# Patient Record
Sex: FEMALE | Race: WHITE | NOT HISPANIC OR LATINO | Employment: FULL TIME | URBAN - METROPOLITAN AREA
[De-identification: names, ages, dates, MRNs, and addresses within clinical notes are randomized per-mention and may not be internally consistent; named-entity substitution may affect disease eponyms.]

---

## 2017-01-05 ENCOUNTER — TELEPHONE (OUTPATIENT)
Dept: OBSTETRICS AND GYNECOLOGY | Age: 42
End: 2017-01-05

## 2017-01-05 NOTE — TELEPHONE ENCOUNTER
"----- Message from Chana Matute sent at 1/5/2017  9:31 AM EST -----  This is the original message sent on 12/27/16. Dr PRESLEY abarca, has had ovarian cysts both drained and removed in the past, pt states that she has a \"lg blood vessel\" in her vaginal and it is hurting and bleeding after sex. Pt has seen the NP in the past and would like to see  Link Please call pt at [phone] Pt had to cancel her appt at 1030 today bc shes sick, when can we work her in? pls leave message with date/time at [phone] (husbands phone) or let me know and I will call and leave message  "

## 2017-01-17 ENCOUNTER — OFFICE VISIT (OUTPATIENT)
Dept: OBSTETRICS AND GYNECOLOGY | Age: 42
End: 2017-01-17

## 2017-01-17 VITALS
BODY MASS INDEX: 39.65 KG/M2 | WEIGHT: 238 LBS | SYSTOLIC BLOOD PRESSURE: 128 MMHG | HEIGHT: 65 IN | DIASTOLIC BLOOD PRESSURE: 76 MMHG

## 2017-01-17 DIAGNOSIS — R32 INCONTINENCE IN FEMALE: ICD-10-CM

## 2017-01-17 DIAGNOSIS — Z90.710 HISTORY OF TOTAL VAGINAL HYSTERECTOMY: Primary | ICD-10-CM

## 2017-01-17 DIAGNOSIS — R61 SWEATING PROFUSELY: ICD-10-CM

## 2017-01-17 DIAGNOSIS — N64.4 BREAST PAIN IN FEMALE: ICD-10-CM

## 2017-01-17 DIAGNOSIS — R63.8 INCREASED BMI: ICD-10-CM

## 2017-01-17 DIAGNOSIS — F11.20 NARCOTIC DEPENDENCE (HCC): ICD-10-CM

## 2017-01-17 PROBLEM — G89.4 CHRONIC PAIN DISORDER: Status: ACTIVE | Noted: 2017-01-17

## 2017-01-17 PROBLEM — F32.A DEPRESSION: Status: ACTIVE | Noted: 2017-01-17

## 2017-01-17 PROCEDURE — 99214 OFFICE O/P EST MOD 30 MIN: CPT | Performed by: OBSTETRICS & GYNECOLOGY

## 2017-01-17 RX ORDER — HYDROCODONE BITARTRATE AND ACETAMINOPHEN 10; 325 MG/1; MG/1
1 TABLET ORAL 3 TIMES DAILY
Refills: 0 | COMMUNITY
Start: 2017-01-10

## 2017-01-17 RX ORDER — AZITHROMYCIN 250 MG/1
TABLET, FILM COATED ORAL
Refills: 0 | COMMUNITY
Start: 2017-01-06 | End: 2020-11-16

## 2017-01-17 RX ORDER — BUSPIRONE HYDROCHLORIDE 7.5 MG/1
7.5 TABLET ORAL 2 TIMES DAILY
Refills: 11 | COMMUNITY
Start: 2016-11-25

## 2017-01-17 RX ORDER — CLONAZEPAM 1 MG/1
1 TABLET ORAL 4 TIMES DAILY PRN
Refills: 3 | COMMUNITY
Start: 2016-12-22

## 2017-01-17 RX ORDER — QUETIAPINE FUMARATE 200 MG/1
200 TABLET, FILM COATED ORAL
Refills: 4 | COMMUNITY
Start: 2016-12-20

## 2017-01-17 RX ORDER — BUPROPION HYDROCHLORIDE 200 MG/1
200 TABLET, EXTENDED RELEASE ORAL 2 TIMES DAILY
Refills: 10 | COMMUNITY
Start: 2017-01-05

## 2017-01-17 RX ORDER — CITALOPRAM 40 MG/1
40 TABLET ORAL EVERY MORNING
Refills: 11 | COMMUNITY
Start: 2016-12-20

## 2017-01-17 RX ORDER — ESTRADIOL 0.1 MG/G
0.5 CREAM VAGINAL 2 TIMES WEEKLY
Qty: 15 G | Refills: 12 | Status: SHIPPED | OUTPATIENT
Start: 2017-01-19 | End: 2020-11-16

## 2017-01-17 NOTE — PROGRESS NOTES
Subjective     Chief Complaint   Patient presents with   • Gynecologic Exam     Blood vessel in vaginal area causing pain,increased hot flashes, ?lump in left breast       Ester Kaur is a 41 y.o. female is being seen today for   Chief Complaint   Patient presents with   • Gynecologic Exam     Blood vessel in vaginal area causing pain,increased hot flashes, ?lump in left breast   She is status post total vaginal hysterectomy and had a TVT by Dr. Dupont.  She has multiple medical issues occurring.  She's been having a lot of hot flashes and sweating and dyspareunia    She is also overdue for a mammogram    She is seen her primary care physician for depression, chronic back pain, disc disease, carpal tunnel syndrome, increased body mass index    She also notes that since she's gained a lot of weight over the past several years her incontinence has returned.  She's not sure if she feels anything unusual in her breast but she would like to get a baseline mammogram.    She also complains of occasional dyspareunia.  This may be associated with her sweats and hot flashes      .    History of Present Illness    The following portions of the patient's history were reviewed and updated as appropriate: allergies, current medications, past family history, past medical history, past social history, past surgical history and problem list.    PAST MEDICAL HISTORY  No past medical history on file.  OB History     No data available        Past Surgical History   Procedure Laterality Date   • Hysterectomy       TVT, TVH     No family history on file.  History   Smoking Status   • Former Smoker   Smokeless Tobacco   • Not on file       Current Outpatient Prescriptions:   •  azithromycin (ZITHROMAX) 250 MG tablet, , Disp: , Rfl: 0  •  buPROPion SR (WELLBUTRIN SR) 200 MG 12 hr tablet, TK 1 T PO BID, Disp: , Rfl: 10  •  busPIRone (BUSPAR) 7.5 MG tablet, TK 2 TS PO BID, Disp: , Rfl: 11  •  citalopram (CeleXA) 40 MG tablet, TK 1 T PO D,  Disp: , Rfl: 11  •  clonazePAM (KlonoPIN) 1 MG tablet, TK 1 T PO QID, Disp: , Rfl: 3  •  HYDROcodone-acetaminophen (NORCO)  MG per tablet, TK 1-2 TS PO TID AS NEEDED FOR PAIN, Disp: , Rfl: 0  •  QUEtiapine (SEROquel) 200 MG tablet, TK 1 T PO D, Disp: , Rfl: 4    There is no immunization history on file for this patient.    Review of Systems a complete review of systems is negative except as outlined in history of present illness    Objective   Physical Exam     Alert and oriented, patient is wearing a wrist brace, abdomen is soft nontender no rebound no guarding, external genitalia are normal vagina is clean dry intact.  There are no blood vessel seen there are no masses, no lesions, and she has excellent vaginal cuff support.  Cervix uterus are absent.  Adnexa are nonenlarged and rectal exam is normal      Assessment/Plan   Ester was seen today for gynecologic exam.    Diagnoses and all orders for this visit:    History of total vaginal hysterectomy  -     Follicle Stimulating Hormone  -     Luteinizing Hormone    Sweating profusely  -     Follicle Stimulating Hormone  -     Luteinizing Hormone       if she is menopausal most of her symptoms would respond to some form of estrogen therapy.  He is not menopausal we'll need to further evaluate what is causing her sweating    Increased BMI  -     Follicle Stimulating Hormone  -     Luteinizing Hormone  -     Mammo Screening Bilateral With CAD; Future    Breast pain in female  -     Follicle Stimulating Hormone  -     Luteinizing Hormone     Bilateral mammogram.  Patient will follow-up with PCP  Incontinence in female  -     Follicle Stimulating Hormone  -     Luteinizing Hormone  I have recommended patient try to lose her excessive amount of weight that she has gained.  If she would like to be evaluated by urologist we will do so.  Narcotic dependence  Patient is prescribed hydrocodone and she takes this 6 times a day.  We had a lengthy discussion regarding her  age and poor outcomes with chronic usage of narcotics    Over 35 minutes of time was devoted to this patient encounter

## 2017-01-18 ENCOUNTER — TELEPHONE (OUTPATIENT)
Dept: OBSTETRICS AND GYNECOLOGY | Age: 42
End: 2017-01-18

## 2017-01-18 LAB
FSH SERPL-ACNC: 8.6 MIU/ML
LH SERPL-ACNC: 9.6 MIU/ML

## 2017-01-18 NOTE — TELEPHONE ENCOUNTER
----- Message from Dominik Mccray MD sent at 1/18/2017  8:55 AM EST -----  Please notify patient that she is not menopausal, hormone levels are normal.  There is no gynecological explanation for her symptoms and I recommend following up with her PCP.  In the meantime we are waiting for her mammogram

## 2017-01-18 NOTE — TELEPHONE ENCOUNTER
----- Message from Claribel Sutton sent at 1/18/2017  1:26 PM EST -----  Regarding: UROLOGIST  Contact: [phone]  DR. CANDELARIO GAVE PATIENT THE NAME OF A UROLOGIST, NOW SHE CAN'T REMEMBER WHO IT WAS.  DO YOU KNOW, OR CAN YOU FIND OUT THE NAME? THANKS

## 2017-01-18 NOTE — PROGRESS NOTES
Please notify patient that she is not menopausal, hormone levels are normal.  There is no gynecological explanation for her symptoms and I recommend following up with her PCP.  In the meantime we are waiting for her mammogram

## 2017-01-27 ENCOUNTER — TELEPHONE (OUTPATIENT)
Dept: OBSTETRICS AND GYNECOLOGY | Age: 42
End: 2017-01-27

## 2017-01-27 DIAGNOSIS — N63.0 BREAST LUMP IN FEMALE: Primary | ICD-10-CM

## 2017-02-21 ENCOUNTER — APPOINTMENT (OUTPATIENT)
Dept: MAMMOGRAPHY | Facility: HOSPITAL | Age: 42
End: 2017-02-21
Attending: OBSTETRICS & GYNECOLOGY

## 2017-02-21 ENCOUNTER — TELEPHONE (OUTPATIENT)
Dept: OBSTETRICS AND GYNECOLOGY | Age: 42
End: 2017-02-21

## 2017-02-21 DIAGNOSIS — N63.0 BREAST MASS: Primary | ICD-10-CM

## 2017-02-21 NOTE — TELEPHONE ENCOUNTER
Pt has MG order in for Monday @ [facility], was under the impression she would have US order for a lump in her R armpit as well. Can we put this in?    Pt # [phone]

## 2017-03-03 ENCOUNTER — HOSPITAL ENCOUNTER (OUTPATIENT)
Dept: ULTRASOUND IMAGING | Facility: HOSPITAL | Age: 42
Discharge: HOME | End: 2017-03-03
Attending: OBSTETRICS & GYNECOLOGY

## 2017-03-03 ENCOUNTER — HOSPITAL ENCOUNTER (OUTPATIENT)
Dept: MAMMOGRAPHY | Facility: HOSPITAL | Age: 42
Discharge: HOME | End: 2017-03-03
Attending: OBSTETRICS & GYNECOLOGY | Admitting: OBSTETRICS & GYNECOLOGY

## 2017-03-03 DIAGNOSIS — N63.0 BREAST MASS: ICD-10-CM

## 2017-03-03 DIAGNOSIS — N63.0 BREAST LUMP IN FEMALE: ICD-10-CM

## 2017-03-03 PROCEDURE — 76642 ULTRASOUND BREAST LIMITED: CPT

## 2017-03-03 PROCEDURE — G0204 DX MAMMO INCL CAD BI: HCPCS

## 2020-08-07 ENCOUNTER — LAB REQUISITION (OUTPATIENT)
Dept: LAB | Facility: HOSPITAL | Age: 45
End: 2020-08-07

## 2020-08-07 DIAGNOSIS — D17.30 BENIGN LIPOMATOUS NEOPLASM OF SKIN AND SUBCUTANEOUS TISSUE OF UNSPECIFIED SITES: ICD-10-CM

## 2020-08-07 DIAGNOSIS — M72.2 PLANTAR FASCIAL FIBROMATOSIS: ICD-10-CM

## 2020-08-07 PROCEDURE — 88305 TISSUE EXAM BY PATHOLOGIST: CPT | Performed by: PODIATRIST

## 2020-08-10 LAB
LAB AP CASE REPORT: NORMAL
PATH REPORT.FINAL DX SPEC: NORMAL
PATH REPORT.GROSS SPEC: NORMAL

## 2020-11-16 ENCOUNTER — PREP FOR SURGERY (OUTPATIENT)
Dept: OTHER | Facility: HOSPITAL | Age: 45
End: 2020-11-16

## 2020-11-16 ENCOUNTER — OFFICE VISIT (OUTPATIENT)
Dept: OBSTETRICS AND GYNECOLOGY | Age: 45
End: 2020-11-16

## 2020-11-16 VITALS
BODY MASS INDEX: 41.15 KG/M2 | WEIGHT: 247 LBS | HEIGHT: 65 IN | SYSTOLIC BLOOD PRESSURE: 134 MMHG | DIASTOLIC BLOOD PRESSURE: 76 MMHG

## 2020-11-16 DIAGNOSIS — N90.7 VULVAR CYST: ICD-10-CM

## 2020-11-16 DIAGNOSIS — N90.89 PERINEAL CYST IN FEMALE: Primary | ICD-10-CM

## 2020-11-16 DIAGNOSIS — F34.1 DYSTHYMIA: ICD-10-CM

## 2020-11-16 DIAGNOSIS — Z01.419 ENCOUNTER FOR GYNECOLOGICAL EXAMINATION: Primary | ICD-10-CM

## 2020-11-16 PROCEDURE — 99386 PREV VISIT NEW AGE 40-64: CPT | Performed by: OBSTETRICS & GYNECOLOGY

## 2020-11-16 PROCEDURE — 99213 OFFICE O/P EST LOW 20 MIN: CPT | Performed by: OBSTETRICS & GYNECOLOGY

## 2020-11-16 RX ORDER — GABAPENTIN 300 MG/1
300 CAPSULE ORAL 3 TIMES DAILY
COMMUNITY
Start: 2020-09-17

## 2020-11-16 RX ORDER — SODIUM CHLORIDE 0.9 % (FLUSH) 0.9 %
3 SYRINGE (ML) INJECTION EVERY 12 HOURS SCHEDULED
Status: CANCELLED | OUTPATIENT
Start: 2021-03-12

## 2020-11-16 RX ORDER — SODIUM CHLORIDE 0.9 % (FLUSH) 0.9 %
10 SYRINGE (ML) INJECTION AS NEEDED
Status: CANCELLED | OUTPATIENT
Start: 2021-03-12

## 2020-11-16 NOTE — PROGRESS NOTES
"Subjective     Chief Complaint   Patient presents with   • Gynecologic Exam     New gyn: re-estab.care,last seen 2017,last pap and mammo 2017,Needs order for mammo         History of Present Illness    Ester Kaur is a very pleasant 45 y.o. female who presents for annual exam., Mammo Exam overdue, Exercise walks 5 times a week  Patient is essentially a new patient because it is been over 3 years since she was seen  She is in for an annual examination but she also has some problems she wanted to discuss.  She is overdue for her mammogram.  She is getting her wellness labs with her PCP  She is still working, her children are doing well.    The problem she wanted discussed was an area of enlargement on her perineal area on the left vulvar region.  This is a recurrent problem.  She does not have any fever or chills no changes in urination or bowel movements.      Obstetric History:  OB History        3    Para   3    Term   3            AB        Living           SAB        TAB        Ectopic        Molar        Multiple        Live Births                   Menstrual History:     No LMP recorded. Patient has had a hysterectomy.       Sexual History:       No past medical history on file.   Past Surgical History:   Procedure Laterality Date   • HYSTERECTOMY      TVT, TVH       SOCIAL Hx:      The following portions of the patient's history were reviewed and updated as appropriate: allergies, current medications, past family history, past medical history, past social history, past surgical history and problem list.    Review of Systems        Except as outlined in history of physical illness, patient denies any changes in her GYN, , GI systems. All other systems reviewed are negative         Objective   Physical Exam    /76   Ht 165.1 cm (65\")   Wt 112 kg (247 lb)   Breastfeeding No   BMI 41.10 kg/m²     General: Patient is alert and oriented and appears overall healthy  Neck: Is supple " without thyromegaly, no carotid bruits and no lymphadenopathy  Lungs: Clear bilaterally, no wheezing, rhonchi, or rales.  Respiratory rate is normal  Breast: Symmetrical, no lymphadenopathy, no masses, no erythema.  Heart: Regular rate and rhythm are appreciated, no murmurs or rubs are heard  Abdomen: Is soft, without organomegaly, bowel sounds are positive, there is no                                rebound or guarding and palpation does not produce any discomfort  Back: Nontender without CVA tenderness  Pelvic: External genitalia appear normal and consistent with mature female.  BUS, there is a 3-1/2 cm vulvar cyst.  It is mobile there is no evidence of infection.  It appears fluid-filled.                            Vagina is clean dry without discharge and appears adequately estrogenized, no               lesions or masses are present                         Adnexa are non-enlarged, non tender               Rectal exam reveals adequate sphincter tone and no masses or lesions are                     appreciated on digital rectal examination.    Annual Well Woman Exam     Patient Active Problem List   Diagnosis   • History of total vaginal hysterectomy   • Narcotic dependence (CMS/HCC)   • Chronic pain disorder   • Depression               Assessment/Plan   Diagnoses and all orders for this visit:    1. Encounter for gynecological examination (Primary)  -     IGP, Apt HPV,rfx 16 / 18,45  -     Mammo Screening Digital Tomosynthesis Bilateral With CAD; Future    2. Vulvar cyst  .  As outlined above this is a problem in addition to her wellness visit concerns.  This is a recurrent cyst, we are going to schedule an excision.  Risk benefits potential complications reviewed.  Pertinent history outlined above  3. Dysthymia        Discussed today's findings and concerns with patient.  Continue to recommend regular exercise including cardiovascular and resistance training as well as  breast self-exam. Wellness lab,  mammography, & pap smear, in accordance with age guidelines.    I have encouraged her to call for today's test results if she has not received them within 10 days.  Patient is advised to call with any change in her condition or with any other questions, otherwise return  for annual examination.

## 2020-11-18 LAB
CYTOLOGIST CVX/VAG CYTO: NORMAL
CYTOLOGY CVX/VAG DOC CYTO: NORMAL
CYTOLOGY CVX/VAG DOC THIN PREP: NORMAL
DX ICD CODE: NORMAL
HIV 1 & 2 AB SER-IMP: NORMAL
HPV I/H RISK 4 DNA CVX QL PROBE+SIG AMP: NEGATIVE
OTHER STN SPEC: NORMAL
STAT OF ADQ CVX/VAG CYTO-IMP: NORMAL

## 2020-12-15 PROBLEM — N90.89 PERINEAL CYST IN FEMALE: Status: ACTIVE | Noted: 2020-12-15

## 2021-02-27 ENCOUNTER — APPOINTMENT (OUTPATIENT)
Dept: MAMMOGRAPHY | Facility: HOSPITAL | Age: 46
End: 2021-02-27

## 2021-03-10 ENCOUNTER — APPOINTMENT (OUTPATIENT)
Dept: PREADMISSION TESTING | Facility: HOSPITAL | Age: 46
End: 2021-03-10

## 2021-03-10 VITALS
DIASTOLIC BLOOD PRESSURE: 82 MMHG | TEMPERATURE: 97.5 F | SYSTOLIC BLOOD PRESSURE: 146 MMHG | WEIGHT: 244.9 LBS | HEART RATE: 80 BPM | RESPIRATION RATE: 16 BRPM | OXYGEN SATURATION: 97 % | HEIGHT: 65 IN | BODY MASS INDEX: 40.8 KG/M2

## 2021-03-10 LAB
ANION GAP SERPL CALCULATED.3IONS-SCNC: 8.9 MMOL/L (ref 5–15)
BUN SERPL-MCNC: 13 MG/DL (ref 6–20)
BUN/CREAT SERPL: 13.3 (ref 7–25)
CALCIUM SPEC-SCNC: 8.9 MG/DL (ref 8.6–10.5)
CHLORIDE SERPL-SCNC: 102 MMOL/L (ref 98–107)
CO2 SERPL-SCNC: 28.1 MMOL/L (ref 22–29)
CREAT SERPL-MCNC: 0.98 MG/DL (ref 0.57–1)
DEPRECATED RDW RBC AUTO: 45.8 FL (ref 37–54)
ERYTHROCYTE [DISTWIDTH] IN BLOOD BY AUTOMATED COUNT: 12.9 % (ref 12.3–15.4)
GFR SERPL CREATININE-BSD FRML MDRD: 62 ML/MIN/1.73
GLUCOSE SERPL-MCNC: 80 MG/DL (ref 65–99)
HCT VFR BLD AUTO: 35.7 % (ref 34–46.6)
HGB BLD-MCNC: 12 G/DL (ref 12–15.9)
MCH RBC QN AUTO: 32.4 PG (ref 26.6–33)
MCHC RBC AUTO-ENTMCNC: 33.6 G/DL (ref 31.5–35.7)
MCV RBC AUTO: 96.5 FL (ref 79–97)
PLATELET # BLD AUTO: 223 10*3/MM3 (ref 140–450)
PMV BLD AUTO: 10.2 FL (ref 6–12)
POTASSIUM SERPL-SCNC: 3.9 MMOL/L (ref 3.5–5.2)
RBC # BLD AUTO: 3.7 10*6/MM3 (ref 3.77–5.28)
SODIUM SERPL-SCNC: 139 MMOL/L (ref 136–145)
WBC # BLD AUTO: 5.66 10*3/MM3 (ref 3.4–10.8)

## 2021-03-10 PROCEDURE — U0004 COV-19 TEST NON-CDC HGH THRU: HCPCS

## 2021-03-10 PROCEDURE — 80048 BASIC METABOLIC PNL TOTAL CA: CPT

## 2021-03-10 PROCEDURE — C9803 HOPD COVID-19 SPEC COLLECT: HCPCS

## 2021-03-10 PROCEDURE — 85027 COMPLETE CBC AUTOMATED: CPT

## 2021-03-10 PROCEDURE — 36415 COLL VENOUS BLD VENIPUNCTURE: CPT

## 2021-03-10 NOTE — DISCHARGE INSTRUCTIONS
Take the following medications the morning of surgery:  BUPROPION, CITALOPRAM, BUSPIRONE, GABAPENTIN, CLONAZEPAM, HYDROCODONE IF NEEDED    ARRIVE 10:00 AM  3/12      If you are on prescription narcotic pain medication to control your pain you may also take that medication the morning of surgery.    General Instructions:  • Do not eat solid food after midnight the night before surgery.  • You may drink clear liquids day of surgery but must stop at least one hour before your hospital arrival time.  • It is beneficial for you to have a clear drink that contains carbohydrates the day of surgery.  We suggest a 12 to 20 ounce bottle of Gatorade or Powerade for non-diabetic patients or a 12 to 20 ounce bottle of G2 or Powerade Zero for diabetic patients. (Pediatric patients, are not advised to drink a 12 to 20 ounce carbohydrate drink)    Clear liquids are liquids you can see through.  Nothing red in color.     Plain water                               Sports drinks  Sodas                                   Gelatin (Jell-O)  Fruit juices without pulp such as white grape juice and apple juice  Popsicles that contain no fruit or yogurt  Tea or coffee (no cream or milk added)  Gatorade / Powerade  G2 / Powerade Zero    • Infants may have breast milk up to four hours before surgery.  • Infants drinking formula may drink formula up to six hours before surgery.   • Patients who avoid smoking, chewing tobacco and alcohol for 4 weeks prior to surgery have a reduced risk of post-operative complications.  Quit smoking as many days before surgery as you can.  • Do not smoke, use chewing tobacco or drink alcohol the day of surgery.   • If applicable bring your C-PAP/ BI-PAP machine.  • Bring any papers given to you in the doctor’s office.  • Wear clean comfortable clothes.  • Do not wear contact lenses, false eyelashes or make-up.  Bring a case for your glasses.   • Bring crutches or walker if applicable.  • Remove all piercings.   Leave jewelry and any other valuables at home.  • Hair extensions with metal clips must be removed prior to surgery.  • The Pre-Admission Testing nurse will instruct you to bring medications if unable to obtain an accurate list in Pre-Admission Testing.        If you were given a blood bank ID arm band remember to bring it with you the day of surgery.    Preventing a Surgical Site Infection:  • For 2 to 3 days before surgery, avoid shaving with a razor because the razor can irritate skin and make it easier to develop an infection.    • Any areas of open skin can increase the risk of a post-operative wound infection by allowing bacteria to enter and travel throughout the body.  Notify your surgeon if you have any skin wounds / rashes even if it is not near the expected surgical site.  The area will need assessed to determine if surgery should be delayed until it is healed.  • The night prior to surgery shower using a fresh bar of anti-bacterial soap (such as Dial) and clean washcloth.  Sleep in a clean bed with clean clothing.  Do not allow pets to sleep with you.  • Shower on the morning of surgery using a fresh bar of anti-bacterial soap (such as Dial) and clean washcloth.  Dry with a clean towel and dress in clean clothing.  • Ask your surgeon if you will be receiving antibiotics prior to surgery.  • Make sure you, your family, and all healthcare providers clean their hands with soap and water or an alcohol based hand  before caring for you or your wound.    Day of surgery:  Your arrival time is approximately two hours before your scheduled surgery time.  Upon arrival, a Pre-op nurse and Anesthesiologist will review your health history, obtain vital signs, and answer questions you may have.  The only belongings needed at this time will be a list of your home medications and if applicable your C-PAP/BI-PAP machine.  A Pre-op nurse will start an IV and you may receive medication in preparation for surgery,  including something to help you relax.     Please be aware that surgery does come with discomfort.  We want to make every effort to control your discomfort so please discuss any uncontrolled symptoms with your nurse.   Your doctor will most likely have prescribed pain medications.      If you are going home after surgery you will receive individualized written care instructions before being discharged.  A responsible adult must drive you to and from the hospital on the day of your surgery and stay with you for 24 hours.  Discharge prescriptions can be filled by the hospital pharmacy during regular pharmacy hours.  If you are having surgery late in the day/evening your prescription may be e-prescribed to your pharmacy.  Please verify your pharmacy hours or chose a 24 hour pharmacy to avoid not having access to your prescription because your pharmacy has closed for the day.    If you are staying overnight following surgery, you will be transported to your hospital room following the recovery period.  [facility] has all private rooms.    If you have any questions please call Pre-Admission Testing at [phone].  Deductibles and co-payments are collected on the day of service. Please be prepared to pay the required co-pay, deductible or deposit on the day of service as defined by your plan.    Patient Education for Self-Quarantine Process    Following your COVID testing, we strongly recommend that you do not leave your home after you have been tested for COVID except to get medical care. This includes not going to work, school or to public areas.  If this is not possible for you to do please limit your activities to only required outings.  Be sure to wear a mask when you are with other people, practice social distancing and wash your hands frequently.      The following items provide additional details to keep you safe.  • Wash your hands with soap and water frequently for at least 20 seconds.    • Avoid touching your eyes, nose and mouth with unwashed hands.  • Do not share anything - utensils, towels, food from the same bowl.   • Have your own utensils, drinking glass, dishes, towels and bedding.   • Do not have visitors.   • Do use FaceTime to stay in touch with family and friends.  • You should stay in a specific room away from others if possible.   • Stay at least 6 feet away from others in the home if you cannot have a dedicated room to yourself.   • Do not snuggle with your pet. While the CDC says there is no evidence that pets can spread COVID-19 or be infected from humans, it is probably best to avoid “petting, snuggling, being kissed or licked and sharing food (during self-quarantine)”, according to the CDC.   • Sanitize household surfaces daily. Include all high touch areas (door handles, light switches, phones, countertops, etc.)  • Do not share a bathroom with others, if possible.   • Wear a mask around others in your home if you are unable to stay in a separate room or 6 feet apart. If  you are unable to wear a mask, have your family member wear a mask if they must be within 6 feet of you.   Call your surgeon immediately if you experience any of the following symptoms:  • Sore Throat  • Shortness of Breath or difficulty breathing  • Cough  • Chills  • Body soreness or muscle pain  • Headache  • Fever  • New loss of taste or smell  • Do not arrive for your surgery ill.  Your procedure will need to be rescheduled to another time.  You will need to call your physician before the day of surgery to avoid any unnecessary exposure to hospital staff as well as other patients.    CHLORHEXIDINE CLOTH INSTRUCTIONS  The morning of surgery follow these instructions using the Chlorhexidine cloths you've been given.  These steps reduce bacteria on the body.  Do not use the cloths near your eyes, ears mouth, genitalia or on open wounds.  Throw the cloths away after use but do not try to flush them down a  toilet.      • Open and remove one cloth at a time from the package.    • Leave the cloth unfolded and begin the bathing.  • Massage the skin with the cloths using gentle pressure to remove bacteria.  Do not scrub harshly.   • Follow the steps below with one 2% CHG cloth per area (6 total cloths).  • One cloth for neck, shoulders and chest.  • One cloth for both arms, hands, fingers and underarms (do underarms last).  • One cloth for the abdomen followed by groin.  • One cloth for right leg and foot including between the toes.  • One cloth for left leg and foot including between the toes.  • The last cloth is to be used for the back of the neck, back and buttocks.    Allow the CHG to air dry 3 minutes on the skin which will give it time to work and decrease the chance of irritation.  The skin may feel sticky until it is dry.  Do not rinse with water or any other liquid or you will lose the beneficial effects of the CHG.  If mild skin irritation occurs, do rinse the skin to remove the CHG.  Report this to the nurse at time of admission.  Do not apply lotions, creams, ointments, deodorants or perfumes after using the clothes. Dress in clean clothes before coming to the hospital.

## 2021-03-11 LAB — SARS-COV-2 RNA RESP QL NAA+PROBE: NOT DETECTED

## 2021-03-12 ENCOUNTER — HOSPITAL ENCOUNTER (OUTPATIENT)
Facility: HOSPITAL | Age: 46
Setting detail: HOSPITAL OUTPATIENT SURGERY
Discharge: HOME | End: 2021-03-12
Attending: OBSTETRICS & GYNECOLOGY | Admitting: OBSTETRICS & GYNECOLOGY

## 2021-03-12 ENCOUNTER — ANESTHESIA EVENT (OUTPATIENT)
Dept: PERIOP | Facility: HOSPITAL | Age: 46
End: 2021-03-12

## 2021-03-12 ENCOUNTER — ANESTHESIA (OUTPATIENT)
Dept: PERIOP | Facility: HOSPITAL | Age: 46
End: 2021-03-12

## 2021-03-12 VITALS
RESPIRATION RATE: 16 BRPM | DIASTOLIC BLOOD PRESSURE: 76 MMHG | WEIGHT: 241.62 LBS | HEIGHT: 65 IN | TEMPERATURE: 98.2 F | HEART RATE: 75 BPM | SYSTOLIC BLOOD PRESSURE: 137 MMHG | BODY MASS INDEX: 40.26 KG/M2 | OXYGEN SATURATION: 99 %

## 2021-03-12 DIAGNOSIS — N90.89 PERINEAL CYST IN FEMALE: ICD-10-CM

## 2021-03-12 PROCEDURE — 25010000002 ONDANSETRON PER 1 MG: Performed by: NURSE ANESTHETIST, CERTIFIED REGISTERED

## 2021-03-12 PROCEDURE — 25010000002 FENTANYL CITRATE (PF) 100 MCG/2ML SOLUTION: Performed by: NURSE ANESTHETIST, CERTIFIED REGISTERED

## 2021-03-12 PROCEDURE — 88304 TISSUE EXAM BY PATHOLOGIST: CPT | Performed by: OBSTETRICS & GYNECOLOGY

## 2021-03-12 PROCEDURE — 25010000002 KETOROLAC TROMETHAMINE PER 15 MG: Performed by: NURSE ANESTHETIST, CERTIFIED REGISTERED

## 2021-03-12 PROCEDURE — 25010000002 HYDROMORPHONE PER 4 MG: Performed by: NURSE ANESTHETIST, CERTIFIED REGISTERED

## 2021-03-12 PROCEDURE — S0260 H&P FOR SURGERY: HCPCS | Performed by: OBSTETRICS & GYNECOLOGY

## 2021-03-12 PROCEDURE — 25010000003 CEFAZOLIN IN DEXTROSE 2-4 GM/100ML-% SOLUTION: Performed by: NURSE ANESTHETIST, CERTIFIED REGISTERED

## 2021-03-12 PROCEDURE — 11426 EXC H-F-NK-SP B9+MARG >4 CM: CPT | Performed by: OBSTETRICS & GYNECOLOGY

## 2021-03-12 PROCEDURE — 25010000002 MIDAZOLAM PER 1 MG: Performed by: ANESTHESIOLOGY

## 2021-03-12 PROCEDURE — 25010000002 DEXAMETHASONE PER 1 MG: Performed by: NURSE ANESTHETIST, CERTIFIED REGISTERED

## 2021-03-12 PROCEDURE — 25010000002 PROPOFOL 10 MG/ML EMULSION: Performed by: NURSE ANESTHETIST, CERTIFIED REGISTERED

## 2021-03-12 RX ORDER — NALOXONE HCL 0.4 MG/ML
0.2 VIAL (ML) INJECTION AS NEEDED
Status: DISCONTINUED | OUTPATIENT
Start: 2021-03-12 | End: 2021-03-12 | Stop reason: HOSPADM

## 2021-03-12 RX ORDER — SODIUM CHLORIDE 0.9 % (FLUSH) 0.9 %
3 SYRINGE (ML) INJECTION EVERY 12 HOURS SCHEDULED
Status: DISCONTINUED | OUTPATIENT
Start: 2021-03-12 | End: 2021-03-12 | Stop reason: HOSPADM

## 2021-03-12 RX ORDER — FLUMAZENIL 0.1 MG/ML
0.2 INJECTION INTRAVENOUS AS NEEDED
Status: DISCONTINUED | OUTPATIENT
Start: 2021-03-12 | End: 2021-03-12 | Stop reason: HOSPADM

## 2021-03-12 RX ORDER — FENTANYL CITRATE 50 UG/ML
INJECTION, SOLUTION INTRAMUSCULAR; INTRAVENOUS AS NEEDED
Status: DISCONTINUED | OUTPATIENT
Start: 2021-03-12 | End: 2021-03-12 | Stop reason: SURG

## 2021-03-12 RX ORDER — OXYCODONE AND ACETAMINOPHEN 7.5; 325 MG/1; MG/1
1 TABLET ORAL ONCE AS NEEDED
Status: COMPLETED | OUTPATIENT
Start: 2021-03-12 | End: 2021-03-12

## 2021-03-12 RX ORDER — ONDANSETRON 2 MG/ML
4 INJECTION INTRAMUSCULAR; INTRAVENOUS ONCE AS NEEDED
Status: DISCONTINUED | OUTPATIENT
Start: 2021-03-12 | End: 2021-03-12 | Stop reason: HOSPADM

## 2021-03-12 RX ORDER — GLYCOPYRROLATE 0.2 MG/ML
INJECTION INTRAMUSCULAR; INTRAVENOUS AS NEEDED
Status: DISCONTINUED | OUTPATIENT
Start: 2021-03-12 | End: 2021-03-12 | Stop reason: SURG

## 2021-03-12 RX ORDER — PROMETHAZINE HYDROCHLORIDE 25 MG/1
25 SUPPOSITORY RECTAL ONCE AS NEEDED
Status: DISCONTINUED | OUTPATIENT
Start: 2021-03-12 | End: 2021-03-12 | Stop reason: HOSPADM

## 2021-03-12 RX ORDER — SODIUM CHLORIDE 0.9 % (FLUSH) 0.9 %
10 SYRINGE (ML) INJECTION AS NEEDED
Status: DISCONTINUED | OUTPATIENT
Start: 2021-03-12 | End: 2021-03-12 | Stop reason: HOSPADM

## 2021-03-12 RX ORDER — FENTANYL CITRATE 50 UG/ML
50 INJECTION, SOLUTION INTRAMUSCULAR; INTRAVENOUS
Status: DISCONTINUED | OUTPATIENT
Start: 2021-03-12 | End: 2021-03-12 | Stop reason: HOSPADM

## 2021-03-12 RX ORDER — DEXAMETHASONE SODIUM PHOSPHATE 10 MG/ML
INJECTION INTRAMUSCULAR; INTRAVENOUS AS NEEDED
Status: DISCONTINUED | OUTPATIENT
Start: 2021-03-12 | End: 2021-03-12 | Stop reason: SURG

## 2021-03-12 RX ORDER — SODIUM CHLORIDE 0.9 % (FLUSH) 0.9 %
3-10 SYRINGE (ML) INJECTION AS NEEDED
Status: DISCONTINUED | OUTPATIENT
Start: 2021-03-12 | End: 2021-03-12 | Stop reason: HOSPADM

## 2021-03-12 RX ORDER — ACETAMINOPHEN 500 MG
1000 TABLET ORAL ONCE
Status: COMPLETED | OUTPATIENT
Start: 2021-03-12 | End: 2021-03-12

## 2021-03-12 RX ORDER — KETOROLAC TROMETHAMINE 30 MG/ML
INJECTION, SOLUTION INTRAMUSCULAR; INTRAVENOUS AS NEEDED
Status: DISCONTINUED | OUTPATIENT
Start: 2021-03-12 | End: 2021-03-12 | Stop reason: SURG

## 2021-03-12 RX ORDER — ONDANSETRON 2 MG/ML
INJECTION INTRAMUSCULAR; INTRAVENOUS AS NEEDED
Status: DISCONTINUED | OUTPATIENT
Start: 2021-03-12 | End: 2021-03-12 | Stop reason: SURG

## 2021-03-12 RX ORDER — HYDROMORPHONE HYDROCHLORIDE 1 MG/ML
0.25 INJECTION, SOLUTION INTRAMUSCULAR; INTRAVENOUS; SUBCUTANEOUS
Status: DISCONTINUED | OUTPATIENT
Start: 2021-03-12 | End: 2021-03-12 | Stop reason: HOSPADM

## 2021-03-12 RX ORDER — DIPHENHYDRAMINE HYDROCHLORIDE 50 MG/ML
12.5 INJECTION INTRAMUSCULAR; INTRAVENOUS
Status: DISCONTINUED | OUTPATIENT
Start: 2021-03-12 | End: 2021-03-12 | Stop reason: HOSPADM

## 2021-03-12 RX ORDER — MIDAZOLAM HYDROCHLORIDE 1 MG/ML
2 INJECTION INTRAMUSCULAR; INTRAVENOUS
Status: DISCONTINUED | OUTPATIENT
Start: 2021-03-12 | End: 2021-03-12 | Stop reason: HOSPADM

## 2021-03-12 RX ORDER — LIDOCAINE HYDROCHLORIDE 20 MG/ML
INJECTION, SOLUTION INFILTRATION; PERINEURAL AS NEEDED
Status: DISCONTINUED | OUTPATIENT
Start: 2021-03-12 | End: 2021-03-12 | Stop reason: SURG

## 2021-03-12 RX ORDER — DIPHENHYDRAMINE HCL 25 MG
25 CAPSULE ORAL
Status: DISCONTINUED | OUTPATIENT
Start: 2021-03-12 | End: 2021-03-12 | Stop reason: HOSPADM

## 2021-03-12 RX ORDER — LIDOCAINE HYDROCHLORIDE 10 MG/ML
0.5 INJECTION, SOLUTION EPIDURAL; INFILTRATION; INTRACAUDAL; PERINEURAL ONCE AS NEEDED
Status: DISCONTINUED | OUTPATIENT
Start: 2021-03-12 | End: 2021-03-12 | Stop reason: HOSPADM

## 2021-03-12 RX ORDER — PROPOFOL 10 MG/ML
VIAL (ML) INTRAVENOUS AS NEEDED
Status: DISCONTINUED | OUTPATIENT
Start: 2021-03-12 | End: 2021-03-12 | Stop reason: SURG

## 2021-03-12 RX ORDER — MIDAZOLAM HYDROCHLORIDE 1 MG/ML
1 INJECTION INTRAMUSCULAR; INTRAVENOUS
Status: DISCONTINUED | OUTPATIENT
Start: 2021-03-12 | End: 2021-03-12 | Stop reason: HOSPADM

## 2021-03-12 RX ORDER — PROMETHAZINE HYDROCHLORIDE 25 MG/1
25 TABLET ORAL ONCE AS NEEDED
Status: DISCONTINUED | OUTPATIENT
Start: 2021-03-12 | End: 2021-03-12 | Stop reason: HOSPADM

## 2021-03-12 RX ORDER — BUPIVACAINE HYDROCHLORIDE 2.5 MG/ML
INJECTION, SOLUTION EPIDURAL; INFILTRATION; INTRACAUDAL AS NEEDED
Status: DISCONTINUED | OUTPATIENT
Start: 2021-03-12 | End: 2021-03-12 | Stop reason: HOSPADM

## 2021-03-12 RX ORDER — FAMOTIDINE 10 MG/ML
20 INJECTION, SOLUTION INTRAVENOUS ONCE
Status: COMPLETED | OUTPATIENT
Start: 2021-03-12 | End: 2021-03-12

## 2021-03-12 RX ORDER — HYDROCODONE BITARTRATE AND ACETAMINOPHEN 7.5; 325 MG/1; MG/1
1 TABLET ORAL ONCE AS NEEDED
Status: DISCONTINUED | OUTPATIENT
Start: 2021-03-12 | End: 2021-03-12 | Stop reason: HOSPADM

## 2021-03-12 RX ORDER — LABETALOL HYDROCHLORIDE 5 MG/ML
5 INJECTION, SOLUTION INTRAVENOUS
Status: DISCONTINUED | OUTPATIENT
Start: 2021-03-12 | End: 2021-03-12 | Stop reason: HOSPADM

## 2021-03-12 RX ORDER — CEFAZOLIN SODIUM 2 G/100ML
INJECTION, SOLUTION INTRAVENOUS AS NEEDED
Status: DISCONTINUED | OUTPATIENT
Start: 2021-03-12 | End: 2021-03-12 | Stop reason: SURG

## 2021-03-12 RX ORDER — ALBUTEROL SULFATE 2.5 MG/3ML
2.5 SOLUTION RESPIRATORY (INHALATION) ONCE AS NEEDED
Status: DISCONTINUED | OUTPATIENT
Start: 2021-03-12 | End: 2021-03-12 | Stop reason: HOSPADM

## 2021-03-12 RX ORDER — SODIUM CHLORIDE, SODIUM LACTATE, POTASSIUM CHLORIDE, CALCIUM CHLORIDE 600; 310; 30; 20 MG/100ML; MG/100ML; MG/100ML; MG/100ML
9 INJECTION, SOLUTION INTRAVENOUS CONTINUOUS
Status: DISCONTINUED | OUTPATIENT
Start: 2021-03-12 | End: 2021-03-12 | Stop reason: HOSPADM

## 2021-03-12 RX ORDER — MAGNESIUM HYDROXIDE 1200 MG/15ML
LIQUID ORAL AS NEEDED
Status: DISCONTINUED | OUTPATIENT
Start: 2021-03-12 | End: 2021-03-12 | Stop reason: HOSPADM

## 2021-03-12 RX ORDER — EPHEDRINE SULFATE 50 MG/ML
5 INJECTION, SOLUTION INTRAVENOUS ONCE AS NEEDED
Status: DISCONTINUED | OUTPATIENT
Start: 2021-03-12 | End: 2021-03-12 | Stop reason: HOSPADM

## 2021-03-12 RX ADMIN — KETOROLAC TROMETHAMINE 30 MG: 30 INJECTION, SOLUTION INTRAMUSCULAR at 12:21

## 2021-03-12 RX ADMIN — SODIUM CHLORIDE, POTASSIUM CHLORIDE, SODIUM LACTATE AND CALCIUM CHLORIDE 9 ML/HR: 600; 310; 30; 20 INJECTION, SOLUTION INTRAVENOUS at 11:05

## 2021-03-12 RX ADMIN — CEFAZOLIN SODIUM 3 G: 2 INJECTION, SOLUTION INTRAVENOUS at 12:18

## 2021-03-12 RX ADMIN — LIDOCAINE HYDROCHLORIDE 80 MG: 20 INJECTION, SOLUTION INFILTRATION; PERINEURAL at 12:06

## 2021-03-12 RX ADMIN — HYDROMORPHONE HYDROCHLORIDE 0.25 MG: 1 INJECTION, SOLUTION INTRAMUSCULAR; INTRAVENOUS; SUBCUTANEOUS at 13:10

## 2021-03-12 RX ADMIN — MIDAZOLAM 1 MG: 1 INJECTION INTRAMUSCULAR; INTRAVENOUS at 11:06

## 2021-03-12 RX ADMIN — HYDROMORPHONE HYDROCHLORIDE 0.25 MG: 1 INJECTION, SOLUTION INTRAMUSCULAR; INTRAVENOUS; SUBCUTANEOUS at 13:23

## 2021-03-12 RX ADMIN — FENTANYL CITRATE 50 MCG: 50 INJECTION, SOLUTION INTRAMUSCULAR; INTRAVENOUS at 13:00

## 2021-03-12 RX ADMIN — FAMOTIDINE 20 MG: 10 INJECTION INTRAVENOUS at 11:05

## 2021-03-12 RX ADMIN — FENTANYL CITRATE 50 MCG: 50 INJECTION INTRAMUSCULAR; INTRAVENOUS at 12:21

## 2021-03-12 RX ADMIN — FENTANYL CITRATE 50 MCG: 50 INJECTION, SOLUTION INTRAMUSCULAR; INTRAVENOUS at 13:07

## 2021-03-12 RX ADMIN — GLYCOPYRROLATE 0.2 MG: 0.2 INJECTION INTRAMUSCULAR; INTRAVENOUS at 12:04

## 2021-03-12 RX ADMIN — ACETAMINOPHEN 1000 MG: 500 TABLET, FILM COATED ORAL at 11:05

## 2021-03-12 RX ADMIN — HYDROMORPHONE HYDROCHLORIDE 0.25 MG: 1 INJECTION, SOLUTION INTRAMUSCULAR; INTRAVENOUS; SUBCUTANEOUS at 13:17

## 2021-03-12 RX ADMIN — PROPOFOL 180 MG: 10 INJECTION, EMULSION INTRAVENOUS at 12:06

## 2021-03-12 RX ADMIN — FENTANYL CITRATE 50 MCG: 50 INJECTION INTRAMUSCULAR; INTRAVENOUS at 12:04

## 2021-03-12 RX ADMIN — DEXAMETHASONE SODIUM PHOSPHATE 8 MG: 10 INJECTION INTRAMUSCULAR; INTRAVENOUS at 12:27

## 2021-03-12 RX ADMIN — HYDROMORPHONE HYDROCHLORIDE 0.25 MG: 1 INJECTION, SOLUTION INTRAMUSCULAR; INTRAVENOUS; SUBCUTANEOUS at 13:04

## 2021-03-12 RX ADMIN — OXYCODONE HYDROCHLORIDE AND ACETAMINOPHEN 1 TABLET: 7.5; 325 TABLET ORAL at 13:26

## 2021-03-12 RX ADMIN — ONDANSETRON 4 MG: 2 INJECTION INTRAMUSCULAR; INTRAVENOUS at 12:37

## 2021-03-12 NOTE — DISCHARGE INSTRUCTIONS
***You had a pain pill at 1:26 PM.***    Pelvic rest, no tampons douching or intercourse for 7 days   Okay to return to work on Monday of next week   Ice pack to perineum 4 times a day for the next 3 days   Call with any fever chills nausea vomiting or change in condition   Return the office in 3 weeks time, please call and make an appointment    HOW DO I REST MY PELVIS?  For as long as told by your health care provider:  · Do not have sex, sexual stimulation, or an orgasm.  · Do not use tampons. Do not douche. Do not put anything in your vagina.  · Avoid activities that take a lot of effort (are strenuous).  · Avoid any activity in which your pelvic muscles could become strained.

## 2021-03-12 NOTE — ANESTHESIA POSTPROCEDURE EVALUATION
"Patient: Ester Kaur    Procedure Summary     Date: 03/12/21 Room / Location: [facility] OR 03 /  [facility]    Anesthesia Start: 1157 Anesthesia Stop: 1254    Procedure: PERINEAL CYST EXCISION (Left Vulva) Diagnosis:       Perineal cyst in female      (Perineal cyst in female [N90.89])    Surgeons: Dominik Mccray MD Provider: Jen Olmedo MD    Anesthesia Type: MAC ASA Status: 3          Anesthesia Type: MAC    Vitals  Vitals Value Taken Time   /64 03/12/21 1345   Temp     Pulse 90 03/12/21 1336   Resp 16 03/12/21 1330   SpO2 95 % 03/12/21 1355   Vitals shown include unvalidated device data.        Post Anesthesia Care and Evaluation    Patient location during evaluation: PHASE II  Patient participation: complete - patient participated  Level of consciousness: awake and alert  Pain score: 1  Pain management: adequate  Airway patency: patent  Anesthetic complications: No anesthetic complications  PONV Status: none  Cardiovascular status: acceptable  Respiratory status: acceptable  Hydration status: acceptable    Comments: /76   Pulse 75   Temp (P) 36.9 °C (98.5 °F) (Oral)   Resp 16   Ht 165.1 cm (65\")   Wt 110 kg (241 lb 10 oz)   SpO2 100%   BMI 40.21 kg/m²       "

## 2021-03-12 NOTE — ADDENDUM NOTE
Addendum  created 03/12/21 1415 by Jen Olmedo MD    Attestation recorded in Intraprocedure, Intraprocedure Attestations filed

## 2021-03-12 NOTE — OP NOTE
Subjective     Date of Service:  03/12/21  Time of Service:  13:42 EST    Surgical Staff: Surgeon(s) and Role:     * Dominik Mccray MD - Primary   Additional Staff:  None   Pre-operative diagnosis(es): Pre-Op Diagnosis Codes:     * Perineal cyst in female [N90.89]     Post-operative diagnosis(es): Post-Op Diagnosis Codes:     * Perineal cyst in female [N90.89]   Procedure(s): Procedure(s):  PERINEAL CYST EXCISION     Antibiotics: cefazolin (Ancef) ordered on call to OR     Anesthesia: Type: Monitored Anesthesia Care  ASA:  III     Objective      Operative findings:  3 x 4 cm left perineal cyst suspicious for endometrioma   Specimens removed: ID Type Source Tests Collected by Time   A : PERINEAL CYST Cyst Perineum TISSUE PATHOLOGY EXAM Dominik Mccray MD 3/12/2021 1225      Fluid Intake:  120 mL   Output:  Estimated blood loss less than 5 cc  I/O this shift:  In: 1050 [P.O.:150; I.V.:900]  Out: 5 [Blood:5]     Blood products used: No   Drains: * No LDAs found *   Implant Information: Nothing was implanted during the procedure   Complications:  None   Intraoperative consult(s):    Condition: stable   Disposition: to PACU and then admit to  To be discharged from PACU         Assessment/Plan     Patient was taken to the OR after discussing planned surgical procedure and agreeing to consent.  She was prepped and draped usual fashion.  She was placed in dorsal lithotomy position.  Vagina and perineum were prepped and draped in the usual fashion.  A 3 x 5 cm cyst on the inner aspect of the labia minora was isolated.  Curved hemostats were placed underneath the cyst from above and from below.  Bovie was then utilized to remove the cyst and it was sent to pathology for further study.  3-0 Vicryl was utilized to close the incisions in a running interlocking fashion after releasing the hemostats.  This was done on both clamps.  Irrigation was used incisions are clean dry intact and hemostatic.  Patient was awakened from  anesthesia and taken recovery room in stable condition.  Estimated blood loss less than 5 cc.              Dominik Mccray MD  03/12/21  13:41 EST

## 2021-03-12 NOTE — ANESTHESIA PREPROCEDURE EVALUATION
Anesthesia Evaluation     Patient summary reviewed and Nursing notes reviewed   NPO Solid Status: > 8 hours  NPO Liquid Status: > 2 hours           Airway   Mallampati: II  Dental - normal exam     Pulmonary - normal exam     ROS comment: STOP-BANG 5  Cardiovascular - negative cardio ROS and normal exam        Neuro/Psych  (+) psychiatric history Anxiety and Depression,     GI/Hepatic/Renal/Endo    (+) morbid obesity,      Musculoskeletal     (+) back pain, chronic pain,   Abdominal   (+) obese,    Substance History - negative use     OB/GYN          Other - negative ROS       ROS/Med Hx Other: Narcotic dependence                  Anesthesia Plan    ASA 3     MAC

## 2021-03-12 NOTE — H&P
"[facility]  Ester Kaur  : 1975  MRN: [id number]  CSN: [id number]    History and Physical  No chief complaint on file.    Subjective   Ester Kaur is a 45 y.o. year old  who present for surgery due to removal of recurrent vulvar cyst.  Risk benefits potential complications including bleeding infection have been reviewed.  Patient previously has had a total vaginal hysterectomy and TVT approximately 10 years ago.  Conservative measures have failed to resolve this vulvar cyst..    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    • Stress incontinence in female      Past Surgical History:   Procedure Laterality Date   • FINGER SURGERY     • HYSTERECTOMY      TVT, TVH   • INCONTINENCE SURGERY     • TONSILLECTOMY       Social History    Tobacco Use      Smoking status: Former Smoker        Quit date:         Years since quittin.1      Smokeless tobacco: Current User      Current Facility-Administered Medications:   •  sodium chloride 0.9 % flush 10 mL, 10 mL, Intravenous, PRN, Dominik Mccray MD  •  sodium chloride 0.9 % flush 3 mL, 3 mL, Intravenous, Q12H, Dominik Mccray MD    Allergies   Allergen Reactions   • Other Mental Status Change     WITH \"LAUGHING GAS\" AT DENTAL OFFICE       Review of Systems      Except as outlined in history of physical illness, patient denies any changes in her GYN, , GI systems. All other systems reviewed are negative.        Objective   There were no vitals taken for this visit.  General: well developed; well nourished  no acute distress  appears stated age   Heart: regular rate and rhythm, S1, S2 normal, no murmur, click, rub or gallop   Lungs: breathing is unlabored  clear to auscultation bilaterally   Abdomen: soft, non-tender; no masses  no umbilical or inguinal hernias are present  no " hepato-splenomegaly   Pelvis:: Clinical staff was present for exam  Extra genitalia female, 3 cm vulvar cyst as previously described, it appears fluctuant, no erythema no lymphadenopathy.  Vagina is clean dry and intact.  Cervix and uterus are absent, adnexa nonpalpable rectal exams normal   Labs  Lab Results   Component Value Date     03/10/2021    HGB 12.0 03/10/2021    HCT 35.7 03/10/2021    WBC 5.66 03/10/2021     03/10/2021    K 3.9 03/10/2021     03/10/2021    CO2 28.1 03/10/2021    BUN 13 03/10/2021    CREATININE 0.98 03/10/2021    GLUCOSE 80 03/10/2021    CALCIUM 8.9 03/10/2021        Assessment   1. Recurrent and symptomatic vulvar cyst     Plan   1. Excision of vulvar cyst, risk benefits potential complications reviewed    Dominik Mccray MD  3/12/2021  10:10 EST       EMR Dragon/ Transcription disclaimer:  Much of the encounter note is an electronic transcription/translation of spoken language to printed text. The electronic translation of spoken language may permit erroneous, or at times, nonessential words or phrases to be inadvertently transcribes; Although i have reviewed the note for such errors, some may still exist.

## 2021-03-12 NOTE — ANESTHESIA PROCEDURE NOTES
Airway  Date/Time: 3/12/2021 12:09 PM    General Information and Staff    Patient location during procedure: OR  Anesthesiologist: Jen Olmedo MD  CRNA: Jagdish Thomas CRNA    Indications and Patient Condition  Indications for airway management: airway protection    Preoxygenated: yes  Mask difficulty assessment: 1 - vent by mask    Final Airway Details  Final airway type: supraglottic airway      Successful airway: unique  Size 4  Cuff Pressure (cm H2O): 15  Airway Seal Pressure (cm H2O): 22    Number of attempts at approach: 1

## 2021-03-15 LAB
LAB AP CASE REPORT: NORMAL
LAB AP DIAGNOSIS COMMENT: NORMAL
PATH REPORT.FINAL DX SPEC: NORMAL
PATH REPORT.GROSS SPEC: NORMAL

## 2021-03-17 ENCOUNTER — TELEPHONE (OUTPATIENT)
Dept: OBSTETRICS AND GYNECOLOGY | Age: 46
End: 2021-03-17

## 2021-03-17 NOTE — TELEPHONE ENCOUNTER
----- Message from Dominik Mccray MD sent at 3/17/2021  2:41 PM EDT -----   Please notify pt. That labs are with in normal limits

## 2021-03-20 ENCOUNTER — TELEPHONE (OUTPATIENT)
Dept: OBSTETRICS AND GYNECOLOGY | Age: 46
End: 2021-03-20

## 2021-03-20 NOTE — TELEPHONE ENCOUNTER
Called pt back after she paged MD on call. She reports she thinks a stitch came out from her vulvar cyst excision. She now has pain at the site when the urine hits it. She denies fever. She reports some bleeding from site and believes there is discharge with odor. Advised pt to proceed to ER for evaluation.

## 2021-03-22 ENCOUNTER — TELEPHONE (OUTPATIENT)
Dept: OBSTETRICS AND GYNECOLOGY | Age: 46
End: 2021-03-22

## 2021-03-22 RX ORDER — CEPHALEXIN 500 MG/1
500 CAPSULE ORAL 4 TIMES DAILY
Qty: 28 CAPSULE | Refills: 0 | Status: SHIPPED | OUTPATIENT
Start: 2021-03-22 | End: 2021-03-29

## 2021-03-22 NOTE — TELEPHONE ENCOUNTER
Patient will like a call back.  Stated stiches are coming out after surgery from last week.  Patient had a cyst in her vaginal area.  Patient is experiencing pain and burning during urination.  Wants to know if this is okay or does she needs to be seen. F/u appt. is not until the 30th. Patient states okay to leave message if she miss call back. Please advise

## 2021-03-22 NOTE — TELEPHONE ENCOUNTER
I would like to call in some oral antibiotics just to cover for any infection that might develop if her stitches are starting to come out.  Those symptoms are not too unusual.  Recommend ice to that area, start the antibiotics, and give us a call back tomorrow around lunch to let us know how she is doing.  Call before if anything changes

## 2021-03-23 ENCOUNTER — OFFICE VISIT (OUTPATIENT)
Dept: OBSTETRICS AND GYNECOLOGY | Age: 46
End: 2021-03-23

## 2021-03-23 VITALS
DIASTOLIC BLOOD PRESSURE: 80 MMHG | SYSTOLIC BLOOD PRESSURE: 130 MMHG | HEIGHT: 65 IN | WEIGHT: 241 LBS | BODY MASS INDEX: 40.15 KG/M2

## 2021-03-23 DIAGNOSIS — Z09 POSTOP CHECK: ICD-10-CM

## 2021-03-23 DIAGNOSIS — R30.9 PAINFUL URINATION: Primary | ICD-10-CM

## 2021-03-23 LAB
BILIRUB BLD-MCNC: NEGATIVE MG/DL
CLARITY, POC: CLEAR
COLOR UR: YELLOW
GLUCOSE UR STRIP-MCNC: NEGATIVE MG/DL
KETONES UR QL: NEGATIVE
LEUKOCYTE EST, POC: ABNORMAL
NITRITE UR-MCNC: NEGATIVE MG/ML
PH UR: 5.5 [PH] (ref 5–8)
PROT UR STRIP-MCNC: ABNORMAL MG/DL
RBC # UR STRIP: ABNORMAL /UL
SP GR UR: 1.02 (ref 1–1.03)
UROBILINOGEN UR QL: NORMAL

## 2021-03-23 PROCEDURE — 99212 OFFICE O/P EST SF 10 MIN: CPT | Performed by: OBSTETRICS & GYNECOLOGY

## 2021-03-23 PROCEDURE — 81002 URINALYSIS NONAUTO W/O SCOPE: CPT | Performed by: OBSTETRICS & GYNECOLOGY

## 2021-03-23 NOTE — PROGRESS NOTES
Subjective       History of Present Illness  Ester Kaur is a 45 y.o. female is being seen today for evaluation of an area on the vulva.  Patient underwent a vulvar cyst removal that was moderately large we thought it was an endometrioma pathology report says mucinous cyst, free of dysplasia no precancerous changes.  Patient had been doing well but she felt something pop and since then says he seen a couple stitches on the skin surface.  Also has some discomfort urinating.  She has been treated with some prophylactic cephalosporin antibiotic first possible UTI when patient was not able to come in she has no fever no chills bowel movements are normal she is able to void and eat regular food  Chief Complaint   Patient presents with   • Post-op Follow-up     Gyn post op: surgery 3/12/21,pain with urination,stitches coming out   .        The following portions of the patient's history were reviewed and updated as appropriate: allergies, current medications, past family history, past medical history, past social history, past surgical history and problem list.    PAST MEDICAL HISTORY  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    • Stress incontinence in female      OB History    Para Term  AB Living   3 3 3         SAB TAB Ectopic Molar Multiple Live Births                    # Outcome Date GA Lbr Jerod/2nd Weight Sex Delivery Anes PTL Lv   3 Term            2 Term            1 Term              Past Surgical History:   Procedure Laterality Date   • BARTHOLIN CYST MARSUPIALIZATION Left 3/12/2021    Procedure: PERINEAL CYST EXCISION;  Surgeon: Dominik Mccray MD;  Location: [facility];  Service: Obstetrics/Gynecology;  Laterality: Left;   • FINGER SURGERY     • HYSTERECTOMY      TVT, TVH   • INCONTINENCE SURGERY     • TONSILLECTOMY        Family History   Problem Relation Age of Onset   • Malig Hyperthermia Neg Hx      Social History     Tobacco Use   Smoking Status Former Smoker   • Quit date:    • Years since quittin.2   Smokeless Tobacco Current User       Current Outpatient Medications:   •  buPROPion SR (WELLBUTRIN SR) 200 MG 12 hr tablet, Take 200 mg by mouth 2 (Two) Times a Day., Disp: , Rfl: 10  •  busPIRone (BUSPAR) 7.5 MG tablet, Take 7.5 mg by mouth 2 (two) times a day., Disp: , Rfl: 11  •  cephalexin (Keflex) 500 MG capsule, Take 1 capsule by mouth 4 (Four) Times a Day for 7 days., Disp: 28 capsule, Rfl: 0  •  citalopram (CeleXA) 40 MG tablet, Take 40 mg by mouth Every Morning., Disp: , Rfl: 11  •  clonazePAM (KlonoPIN) 1 MG tablet, 1 mg 4 (Four) Times a Day As Needed., Disp: , Rfl: 3  •  gabapentin (NEURONTIN) 300 MG capsule, Take 300 mg by mouth 3 (Three) Times a Day., Disp: , Rfl:   •  HYDROcodone-acetaminophen (NORCO)  MG per tablet, Take 1 tablet by mouth 3 (Three) Times a Day., Disp: , Rfl: 0  •  QUEtiapine (SEROquel) 200 MG tablet, Take 200 mg by mouth., Disp: , Rfl: 4  •  Chlorhexidine Gluconate 2 % pads, Apply  topically. As directed pre op, Disp: , Rfl:     There is no immunization history on file for this patient.    Review of Systems       Except as outlined in history of physical illness, patient denies any changes in her GYN, , GI systems. All other systems reviewed are negative.    Objective   Physical Exam   Alert and oriented, respirations unlabored, heart regular rate and rhythm   Pelvic external genitalia and reveals the area of the left vulvar cyst excision is healing, there is no erythema there is no discharge there is no purulent material.  But you can see where the 2 or 3 of the sutures have popped out those were grasped with pickups and removed.  Upper vaginal vault was normal cervix and uterus are absent      Assessment/Plan   Diagnoses and all orders for this visit:    1. Painful urination  (Primary)  -     POC Urinalysis Dipstick    2. Postop check    As outlined above healing adequately but she popped 2 stitches on the incision and those were removed             Orders Placed This Encounter   Procedures   • POC Urinalysis Dipstick           EMR Dragon/ Transcription disclaimer:  Much of the encounter note is an electronic transcription/translation of spoken language to printed text. The electronic translation of spoken language may permit erroneous, or at times, nonessential words or phrases to be inadvertently transcribes; Although i have reviewed the note for such errors, some may still exist.

## 2021-04-12 ENCOUNTER — OFFICE VISIT (OUTPATIENT)
Dept: OBSTETRICS AND GYNECOLOGY | Age: 46
End: 2021-04-12

## 2021-04-12 VITALS
WEIGHT: 238 LBS | DIASTOLIC BLOOD PRESSURE: 70 MMHG | SYSTOLIC BLOOD PRESSURE: 124 MMHG | HEIGHT: 65 IN | BODY MASS INDEX: 39.65 KG/M2

## 2021-04-12 DIAGNOSIS — N32.81 OVERACTIVE BLADDER: Primary | ICD-10-CM

## 2021-04-12 DIAGNOSIS — N95.1 PERIMENOPAUSAL: ICD-10-CM

## 2021-04-12 PROCEDURE — 99213 OFFICE O/P EST LOW 20 MIN: CPT | Performed by: OBSTETRICS & GYNECOLOGY

## 2021-04-12 RX ORDER — OXYBUTYNIN CHLORIDE 5 MG/1
5 TABLET, EXTENDED RELEASE ORAL DAILY
Qty: 60 TABLET | Refills: 6 | Status: SHIPPED | OUTPATIENT
Start: 2021-04-12

## 2021-04-12 NOTE — PROGRESS NOTES
Subjective       History of Present Illness  Ester Kaur is a 45 y.o. female is being seen today for postoperative check initially but patient also has some other gynecological concerns.    She had a vulvar cyst removed and states over the last week or so she is doing very well completely asymptomatic and very happy the surgery was performed.  Pathology report has already been reviewed and its benign    Additionally she has had a TVT by one of my partners in , but started to have more incontinence, sounds mostly like detrusor dyssynergia or overactive bladder with may be a touch of stress urinary incontinence still mixed in    Additionally, she is having a lot of hot flashes wants to know she is menopausal  Chief Complaint   Patient presents with   • Post-op Follow-up     Cyst removal   .        The following portions of the patient's history were reviewed and updated as appropriate: allergies, current medications, past family history, past medical history, past social history, past surgical history and problem list.    PAST MEDICAL HISTORY  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    • Stress incontinence in female      OB History    Para Term  AB Living   3 3 3         SAB TAB Ectopic Molar Multiple Live Births                    # Outcome Date GA Lbr Jerod/2nd Weight Sex Delivery Anes PTL Lv   3 Term            2 Term            1 Term              Past Surgical History:   Procedure Laterality Date   • BARTHOLIN CYST MARSUPIALIZATION Left 3/12/2021    Procedure: PERINEAL CYST EXCISION;  Surgeon: Dominik Mccray MD;  Location: [facility];  Service: Obstetrics/Gynecology;  Laterality: Left;   • FINGER SURGERY     • HYSTERECTOMY      TVT, TVH   • INCONTINENCE SURGERY     • TONSILLECTOMY       Family History   Problem Relation  Age of Onset   • Malig Hyperthermia Neg Hx      Social History     Tobacco Use   Smoking Status Former Smoker   • Quit date:    • Years since quittin.2   Smokeless Tobacco Current User       Current Outpatient Medications:   •  buPROPion SR (WELLBUTRIN SR) 200 MG 12 hr tablet, Take 200 mg by mouth 2 (Two) Times a Day., Disp: , Rfl: 10  •  busPIRone (BUSPAR) 7.5 MG tablet, Take 7.5 mg by mouth 2 (two) times a day., Disp: , Rfl: 11  •  citalopram (CeleXA) 40 MG tablet, Take 40 mg by mouth Every Morning., Disp: , Rfl: 11  •  clonazePAM (KlonoPIN) 1 MG tablet, 1 mg 4 (Four) Times a Day As Needed., Disp: , Rfl: 3  •  gabapentin (NEURONTIN) 300 MG capsule, Take 300 mg by mouth 3 (Three) Times a Day., Disp: , Rfl:   •  HYDROcodone-acetaminophen (NORCO)  MG per tablet, Take 1 tablet by mouth 3 (Three) Times a Day., Disp: , Rfl: 0  •  QUEtiapine (SEROquel) 200 MG tablet, Take 200 mg by mouth., Disp: , Rfl: 4  •  Chlorhexidine Gluconate 2 % pads, Apply  topically. As directed pre op, Disp: , Rfl:   •  oxybutynin XL (DITROPAN-XL) 5 MG 24 hr tablet, Take 1 tablet by mouth Daily. 1 daily, Disp: 60 tablet, Rfl: 6    There is no immunization history on file for this patient.    Review of Systems       Except as outlined in history of physical illness, patient denies any changes in her GYN, , GI systems. All other systems reviewed are negative.    Objective   Physical Exam   Alert and oriented, respirations unlabored, heart regular rate and rhythm   Pelvic external genitalia normal vagina clean dry intact incision is very well-healed, almost hard to see.  Mucosa appears adequately estrogenized.  Cervix uterus are absent, vaginal wall anteriorly seems supported adequately.  Rectal exams normal      Assessment/Plan   Diagnoses and all orders for this visit:    1. Overactive bladder (Primary)    2. Perimenopausal  -     Follicle Stimulating Hormone    Other orders  -     oxybutynin XL (DITROPAN-XL) 5 MG 24 hr tablet;  Take 1 tablet by mouth Daily. 1 daily  Dispense: 60 tablet; Refill: 6    Patient has done well from the postoperative point of view.  She can return to normal activities and intercourse with adequate lubrication  We will see if she is menopausal but clinically she does not appear to be that way.  Additionally because of her incontinence she wants a trial of Ditropan XL 5 mg and that will be called in             Orders Placed This Encounter   Procedures   • Follicle Stimulating Hormone     Order Specific Question:   Release to patient     Answer:   Immediate           EMR Dragon/ Transcription disclaimer:  Much of the encounter note is an electronic transcription/translation of spoken language to printed text. The electronic translation of spoken language may permit erroneous, or at times, nonessential words or phrases to be inadvertently transcribes; Although i have reviewed the note for such errors, some may still exist.

## 2021-04-13 ENCOUNTER — TELEPHONE (OUTPATIENT)
Dept: OBSTETRICS AND GYNECOLOGY | Age: 46
End: 2021-04-13

## 2021-04-13 LAB — FSH SERPL-ACNC: 5.9 MIU/ML

## 2021-04-13 NOTE — TELEPHONE ENCOUNTER
----- Message from Dominik Mccray MD sent at 4/13/2021  8:47 AM EDT -----   Please notify pt. That labs are with in normal limits

## 2021-11-24 ENCOUNTER — OFFICE VISIT (OUTPATIENT)
Dept: OBSTETRICS AND GYNECOLOGY | Age: 46
End: 2021-11-24

## 2021-11-24 VITALS
DIASTOLIC BLOOD PRESSURE: 78 MMHG | SYSTOLIC BLOOD PRESSURE: 128 MMHG | HEIGHT: 65 IN | WEIGHT: 232.2 LBS | BODY MASS INDEX: 38.69 KG/M2

## 2021-11-24 DIAGNOSIS — N95.1 PERIMENOPAUSAL: ICD-10-CM

## 2021-11-24 DIAGNOSIS — Z00.00 ENCOUNTER FOR ANNUAL PHYSICAL EXAM: Primary | ICD-10-CM

## 2021-11-24 PROCEDURE — 99396 PREV VISIT EST AGE 40-64: CPT | Performed by: OBSTETRICS & GYNECOLOGY

## 2021-11-24 RX ORDER — PREDNISONE 10 MG/1
TABLET ORAL
COMMUNITY
Start: 2021-10-13

## 2021-11-24 RX ORDER — AZITHROMYCIN 250 MG/1
TABLET, FILM COATED ORAL
COMMUNITY
Start: 2021-10-20

## 2021-11-24 RX ORDER — METHYLPREDNISOLONE 4 MG/1
TABLET ORAL
COMMUNITY
Start: 2021-10-20 | End: 2021-11-24 | Stop reason: SDUPTHER

## 2021-11-24 RX ORDER — ALBUTEROL SULFATE 90 UG/1
AEROSOL, METERED RESPIRATORY (INHALATION)
COMMUNITY
Start: 2021-10-07

## 2021-11-24 NOTE — PROGRESS NOTES
Subjective     Chief Complaint   Patient presents with   • Gynecologic Exam     Last Pap 20 (-), Last Mammo 2017, Last C/Scope Not on File, No complaints at this time.         History of Present Illness    Ester Kaur is a very pleasant 46 y.o. female ., Mammo Exam overdue and is ordered, Exercise encouraged    Patient has had a vaginal hysterectomy and a TVT, she has no gynecologic concerns or complaints.    She is receiving her wellness labs with her PCP Dr. Noah Ttae    She had COVID last October still has some fatigue.    She is still working her oldest daughter is now working as a nurse, her youngest son is going into the     .      Obstetric History:  OB History        3    Para   3    Term   3            AB        Living           SAB        IAB        Ectopic        Molar        Multiple        Live Births                   Menstrual History:     No LMP recorded. Patient has had a hysterectomy.       Sexual History:       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    • Stress incontinence in female       Past Surgical History:   Procedure Laterality Date   • BARTHOLIN CYST MARSUPIALIZATION Left 3/12/2021    Procedure: PERINEAL CYST EXCISION;  Surgeon: Dominik Mccray MD;  Location: [facility];  Service: Obstetrics/Gynecology;  Laterality: Left;   • FINGER SURGERY     • HYSTERECTOMY      TVT, TVH   • INCONTINENCE SURGERY     • TONSILLECTOMY         SOCIAL Hx:      The following portions of the patient's history were reviewed and updated as appropriate: allergies, current medications, past family history, past medical history, past social history, past surgical history and problem list.    Review of Systems        Except as outlined in history of physical illness, patient denies any changes in her GYN,  ", GI systems. All other systems reviewed are negative      Urinary incontinence assessment discussed       Objective   Physical Exam    /78   Ht 165.1 cm (65\")   Wt 105 kg (232 lb 3.2 oz)   Breastfeeding No   BMI 38.64 kg/m²     General: Patient is alert and oriented and appears overall healthy  Neck: Is supple without thyromegaly, no carotid bruits and no lymphadenopathy  Lungs: Clear bilaterally, no wheezing, rhonchi, or rales.  Respiratory rate is normal  Breast: Symmetrical, no lymphadenopathy, no masses, no erythema.  Heart: Regular rate and rhythm are appreciated, no murmurs or rubs are heard  Abdomen: Is soft, without organomegaly, bowel sounds are positive, there is no                                rebound or guarding and palpation does not produce any discomfort  Back: Nontender without CVA tenderness  Pelvic: External genitalia appear normal and consistent with mature female.  BUS normal                  Urethral meatus is normal without discharge masses or tenderness                Urethra is normal without tenderness                Bladder is not enlarged not tender                            Vagina is clean dry without discharge and appears adequately estrogenized, no               lesions or masses are present                         Adnexa are non-enlarged, non tender               Rectal digital exam reveals adequate sphincter tone and no masses or lesions are                     appreciated on digital rectal examination.         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 Encounter for annual physical exam (Primary)  -     IGP, Apt HPV,rfx 16 / 18,45  -     Mammo Screening Digital Tomosynthesis Bilateral With CAD; Future    2. Perimenopausal  -     Mammo Screening Digital " Tomosynthesis Bilateral With CAD; Future        Discussed today's findings and concerns with patient.  Continue to recommend regular exercise including cardiovascular and resistance training as well as  breast self-exam. Wellness lab, mammography, & pap smear, in accordance with age guidelines.    I have encouraged her to call for today's test results if she has not received them within 10 days.  Patient is advised to call with any change in her condition or with any other questions, otherwise return  for annual examination.

## 2021-11-30 LAB
CYTOLOGIST CVX/VAG CYTO: ABNORMAL
CYTOLOGY CVX/VAG DOC CYTO: ABNORMAL
CYTOLOGY CVX/VAG DOC THIN PREP: ABNORMAL
DX ICD CODE: ABNORMAL
HIV 1 & 2 AB SER-IMP: ABNORMAL
HPV I/H RISK 4 DNA CVX QL PROBE+SIG AMP: POSITIVE
HPV16 DNA CVX QL PROBE+SIG AMP: NEGATIVE
HPV18+45 E6+E7 MRNA CVX QL NAA+PROBE: NEGATIVE
OTHER STN SPEC: ABNORMAL
STAT OF ADQ CVX/VAG CYTO-IMP: ABNORMAL

## 2023-11-12 NOTE — MR AVS SNAPSHOT
Ester Kaur   1/17/2017 3:50 PM   Office Visit    Dept Phone:  [phone]   Encounter #:  [id number]    Provider:  Dominik Mccray MD   Department:  [facility]                Your Full Care Plan              Your Updated Medication List          This list is accurate as of: 1/17/17  4:30 PM.  Always use your most recent med list.                azithromycin 250 MG tablet   Commonly known as:  ZITHROMAX       buPROPion  MG 12 hr tablet   Commonly known as:  WELLBUTRIN SR       busPIRone 7.5 MG tablet   Commonly known as:  BUSPAR       citalopram 40 MG tablet   Commonly known as:  CeleXA       clonazePAM 1 MG tablet   Commonly known as:  KlonoPIN       HYDROcodone-acetaminophen  MG per tablet   Commonly known as:  NORCO       QUEtiapine 200 MG tablet   Commonly known as:  SEROquel               We Performed the Following     Follicle Stimulating Hormone     Luteinizing Hormone       You Were Diagnosed With        Codes Comments    History of total vaginal hysterectomy    -  Primary ICD-10-CM: Z90.710  ICD-9-CM: V88.01     Sweating profusely     ICD-10-CM: R61  ICD-9-CM: 780.8     Increased BMI     ICD-10-CM: R63.8  ICD-9-CM: 783.9     Breast pain in female     ICD-10-CM: N64.4  ICD-9-CM: 611.71     Incontinence in female     ICD-10-CM: R32  ICD-9-CM: 625.6     Narcotic dependence     ICD-10-CM: F11.20  ICD-9-CM: 304.90       Instructions     None    Patient Instructions History      Upcoming Appointments     Visit Type Date Time Department    GYN FOLLOW UP 1/17/2017  3:50 PM [facility]      Apptentive Signup     [facility] Apptentive allows you to send messages to your doctor, view your test results, renew your prescriptions, schedule appointments, and more. To sign up, go to [email or web address] and click on the Sign Up Now link in the New User? box. Enter your Apptentive Activation Code exactly as it appears below along with the last four  "digits of your Social Security Number and your Date of Birth () to complete the sign-up process. If you do not sign up before the expiration date, you must request a new code.    [employer] Activation Code: [email or web address]  Expires: 2017  4:30 PM    If you have questions, you can email [email or web address] or call [phone] to talk to our [employer] staff. Remember, [employer] is NOT to be used for urgent needs. For medical emergencies, dial 911.               Other Info from Your Visit           Allergies     No Known Allergies      Reason for Visit     Gynecologic Exam Blood vessel in vaginal area causing pain,increased hot flashes, ?lump in left breast      Vital Signs     Blood Pressure Height Weight Body Mass Index Smoking Status       128/76 65\" (165.1 cm) 238 lb (108 kg) 39.61 kg/m2 Former Smoker       Problems and Diagnoses Noted     Chronic pain disorder    History of total vaginal hysterectomy (TVH)    Narcotic dependence    Excessive sweating        Increased BMI (body mass index)        Breast pain in female        Incontinence in female            " No

## 2024-06-19 ENCOUNTER — TRANSCRIBE ORDERS (OUTPATIENT)
Dept: ADMINISTRATIVE | Facility: HOSPITAL | Age: 49
End: 2024-06-19
Payer: COMMERCIAL

## 2024-06-19 DIAGNOSIS — Z12.31 SCREENING MAMMOGRAM, ENCOUNTER FOR: Primary | ICD-10-CM

## 2024-06-20 ENCOUNTER — TELEPHONE (OUTPATIENT)
Dept: OBSTETRICS AND GYNECOLOGY | Age: 49
End: 2024-06-20

## 2024-06-20 NOTE — TELEPHONE ENCOUNTER
Caller: Ester Kaur    Relationship: Self    Best call back number: [phone]    What orders are you requesting (i.e. lab or imaging): IMAGING- DIAGNOSTIC MAMMOGRAM    In what timeframe would the patient need to come in: ASAP    Where will you receive your lab/imaging services: [facility]    Additional notes: PT HAS NOT BEEN SEEN SINCE 11/2021. PT IS SCHEDULED FOR STANDARD MAMMOGRAM AT   [facility] ON 6/25/2024. PT IS ASKING IF SHE CAN HAVE MAMMOGRAM SCHEDULED AT [facility] WITH A NEW ORDER FOR BILATERAL BREAST DIAGNOSTIC MAMMOGRAM DUE TO THE PAIN SHE IS HAVING AND ISSUE WITH BREAST. PT IS SCHEDULED FOR YEARLY EXAM ON 7/2/2024 WITH APRN

## 2024-06-25 ENCOUNTER — HOSPITAL ENCOUNTER (OUTPATIENT)
Dept: MAMMOGRAPHY | Facility: HOSPITAL | Age: 49
Discharge: HOME | End: 2024-06-25
Admitting: INTERNAL MEDICINE
Payer: COMMERCIAL

## 2024-06-25 DIAGNOSIS — Z12.31 SCREENING MAMMOGRAM, ENCOUNTER FOR: ICD-10-CM

## 2024-06-25 PROCEDURE — 77067 SCR MAMMO BI INCL CAD: CPT

## 2024-06-25 PROCEDURE — 77063 BREAST TOMOSYNTHESIS BI: CPT

## 2024-06-27 ENCOUNTER — TRANSCRIBE ORDERS (OUTPATIENT)
Dept: ADMINISTRATIVE | Facility: HOSPITAL | Age: 49
End: 2024-06-27
Payer: COMMERCIAL

## 2024-06-27 DIAGNOSIS — R92.8 ABNORMAL MAMMOGRAM: Primary | ICD-10-CM

## 2024-08-01 ENCOUNTER — HOSPITAL ENCOUNTER (OUTPATIENT)
Dept: MAMMOGRAPHY | Facility: HOSPITAL | Age: 49
Discharge: HOME | End: 2024-08-01
Payer: COMMERCIAL

## 2024-08-01 ENCOUNTER — HOSPITAL ENCOUNTER (OUTPATIENT)
Dept: ULTRASOUND IMAGING | Facility: HOSPITAL | Age: 49
Discharge: HOME | End: 2024-08-01
Payer: COMMERCIAL

## 2024-08-01 DIAGNOSIS — R92.8 ABNORMAL MAMMOGRAM: ICD-10-CM

## 2024-08-01 PROCEDURE — 76642 ULTRASOUND BREAST LIMITED: CPT

## 2024-08-01 PROCEDURE — 77065 DX MAMMO INCL CAD UNI: CPT

## 2024-08-01 PROCEDURE — G0279 TOMOSYNTHESIS, MAMMO: HCPCS

## 2024-09-06 NOTE — PROGRESS NOTES
09/09/24 0001   Pre-Procedure Phone Call   Procedure Time Verified Yes   Arrival Time 1330   Procedure Location Verified Yes   Medical History Reviewed Yes   NPO Status Reinforced No   Ride and Caregiver Arranged No   Patient Knows to Bring Current Medications No   Bring Outside Films Requested No

## 2024-09-09 ENCOUNTER — HOSPITAL ENCOUNTER (OUTPATIENT)
Dept: MAMMOGRAPHY | Facility: HOSPITAL | Age: 49
Discharge: HOME | End: 2024-09-09
Payer: COMMERCIAL

## 2024-09-09 ENCOUNTER — HOSPITAL ENCOUNTER (OUTPATIENT)
Dept: ULTRASOUND IMAGING | Facility: HOSPITAL | Age: 49
Discharge: HOME | End: 2024-09-09
Payer: COMMERCIAL

## 2024-09-09 VITALS
HEART RATE: 85 BPM | HEIGHT: 66 IN | DIASTOLIC BLOOD PRESSURE: 92 MMHG | SYSTOLIC BLOOD PRESSURE: 165 MMHG | RESPIRATION RATE: 16 BRPM | TEMPERATURE: 98 F | OXYGEN SATURATION: 97 % | BODY MASS INDEX: 37.5 KG/M2

## 2024-09-09 DIAGNOSIS — R92.8 ABNORMAL MAMMOGRAM: ICD-10-CM

## 2024-09-09 PROCEDURE — 88305 TISSUE EXAM BY PATHOLOGIST: CPT | Performed by: INTERNAL MEDICINE

## 2024-09-09 PROCEDURE — 77065 DX MAMMO INCL CAD UNI: CPT

## 2024-09-09 PROCEDURE — A4648 IMPLANTABLE TISSUE MARKER: HCPCS

## 2024-09-09 PROCEDURE — 25010000002 LIDOCAINE 1 % SOLUTION: Performed by: RADIOLOGY

## 2024-09-09 RX ORDER — LIDOCAINE HYDROCHLORIDE 10 MG/ML
3 INJECTION, SOLUTION INFILTRATION; PERINEURAL ONCE
Status: COMPLETED | OUTPATIENT
Start: 2024-09-09 | End: 2024-09-09

## 2024-09-09 RX ORDER — ERGOCALCIFEROL 1.25 MG/1
50000 CAPSULE, LIQUID FILLED ORAL
COMMUNITY

## 2024-09-09 RX ADMIN — LIDOCAINE HYDROCHLORIDE 1.5 ML: 10 INJECTION, SOLUTION INFILTRATION; PERINEURAL at 15:26

## 2024-09-09 RX ADMIN — LIDOCAINE HYDROCHLORIDE 5 ML: 10; .005 INJECTION, SOLUTION EPIDURAL; INFILTRATION; INTRACAUDAL; PERINEURAL at 15:27

## 2024-09-09 RX ADMIN — LIDOCAINE HYDROCHLORIDE 2 ML: 10 INJECTION, SOLUTION INFILTRATION; PERINEURAL at 15:34

## 2024-09-09 RX ADMIN — LIDOCAINE HYDROCHLORIDE 7 ML: 10; .005 INJECTION, SOLUTION EPIDURAL; INFILTRATION; INTRACAUDAL; PERINEURAL at 15:35

## 2024-09-09 NOTE — NURSING NOTE
Biopsy sites to left upper slightly inner breast and left upper more mid breast clear with Exofin dry and intact to both sites. No new firmness or swelling noted at or around either biopsy site. Patient had a new area of soreness, which is a palpable mass,at a separate location than the planned biopsy site. Both sites were biopsied. Order for 2nd site obtained from Dr. JESSICA Tate's office. Denies pain. Ice pack with protective covering applied to biopsy sites. Discharge instructions discussed with understanding voiced by patient. Copies provided to patient. No distress noted. To home via private vehicle.

## 2024-09-10 ENCOUNTER — TELEPHONE (OUTPATIENT)
Dept: MAMMOGRAPHY | Facility: HOSPITAL | Age: 49
End: 2024-09-10
Payer: COMMERCIAL

## 2024-09-10 LAB
CYTO UR: NORMAL
CYTO UR: NORMAL
LAB AP CASE REPORT: NORMAL
LAB AP CASE REPORT: NORMAL
LAB AP CLINICAL INFORMATION: NORMAL
LAB AP CLINICAL INFORMATION: NORMAL
LAB AP INTRADEPARTMENTAL CONSULT: NORMAL
LAB AP INTRADEPARTMENTAL CONSULT: NORMAL
PATH REPORT.FINAL DX SPEC: NORMAL
PATH REPORT.FINAL DX SPEC: NORMAL
PATH REPORT.GROSS SPEC: NORMAL
PATH REPORT.GROSS SPEC: NORMAL

## 2024-09-10 NOTE — TELEPHONE ENCOUNTER
Spoke with Hillary with Dr. Bruno office. Called to give Bx. Results however she just wanted them faxed. Results faxed to [phone]

## 2024-09-25 ENCOUNTER — OFFICE VISIT (OUTPATIENT)
Dept: SURGERY | Facility: CLINIC | Age: 49
End: 2024-09-25
Payer: COMMERCIAL

## 2024-09-25 VITALS
HEIGHT: 65 IN | BODY MASS INDEX: 35.32 KG/M2 | DIASTOLIC BLOOD PRESSURE: 84 MMHG | SYSTOLIC BLOOD PRESSURE: 126 MMHG | WEIGHT: 212 LBS

## 2024-09-25 DIAGNOSIS — R92.8 ABNORMAL FINDING ON BREAST IMAGING: Primary | ICD-10-CM

## 2024-09-25 PROCEDURE — 99204 OFFICE O/P NEW MOD 45 MIN: CPT | Performed by: STUDENT IN AN ORGANIZED HEALTH CARE EDUCATION/TRAINING PROGRAM

## 2024-09-25 NOTE — PROGRESS NOTES
GENERAL SURGERY BENIGN BREAST HISTORY AND PHYSICAL     SUMMARY:  Ester Kaur is a 49 y.o. lady with:  A new diagnosis of a discordant left breast biopsy.  -Surgical plan: Discussed recommendation for left breast wire localized excisional biopsy. Risks (including bleeding, infection, damage to surrounding structures, need for additional surgery), benefits and alternatives were discussed with the patient who agreed and wished to proceed. Risk of pathologic upgrade was also discussed with possible need for additional treatment.     High risk screening:   -Rubia Erickson lifetime breast cancer risk to be calculated postoperatively.    Referring Provider: No ref. provider found    Chief complaint: abnormal breast imaging    HPI: Ms. Ester Kaur is seen at the request of No ref. provider found. The patient is a 49 y.o. woman being seen for a new diagnosis of left breast discordant biopsy.      This was initially detected as an imaging abnormality on routine screening. Her work-up is detailed in the breast history section below. She has had one prior mammogram in 2017. She denies any prior history of abnormal mammograms or breast biopsies. She denies any breast lumps, skin changes, or nipple discharge. She has some breast pain.     She has a family history of breast cancer in her paternal aunt and paternal cousin. She denies any family history of ovarian cancer.     TIMELINE OF WORKUP:  6/25/2024 bilateral screening mammogram   IMPRESSION/RECOMMENDATION(S):  Left breast focal asymmetry. Recommend further evaluation with CC and MLO spot compression and lateral views with tomosynthesis and targeted ultrasound. No mammographic evidence of malignancy in the right breast.  BI-RADS Category 0: Incomplete    8/1/2024 Left Breast Diagnostic Mammo/US   FINDINGS: There are scattered areas of fibroglandular density.The area of asymmetry in the upper inner quadrant of the left breast persists on the spot compression views.  Targeted sonogram of the upper inner quadrant of the left breast was performed. In the 11 o'clock position of the left breast approximately 5 cm from the nipple there is an approximately 6 mm in greatest dimension hypoechoic nodule. This does not resemble a cyst and is suspicious for malignancy.  IMPRESSION:  1. Approximately 6 mm 11 o'clock position nodule of the left breast as discussed above.  2. Ultrasound-guided core biopsy recommended. BI-RADS 4.    9/9/2024 Left Breast US Guided Biopsy   PROCEDURE:   Targeted sonographic of the left breast was initially performed in the  area of concern indicated by the patient in the upper inner quadrant. At  10:00, 8 cm from the nipple, there is a 2.3 x 0.5 x 1.7 cm heterogeneous  predominately echogenic mass, which is suspicious. Ultrasound-guided  core needle biopsy of this area is also recommended, to be performed  today.     Site 1: The mass at 11:00, 5 cm from the nipple in the left breast was  targeted under ultrasound. The skin was cleansed and prepped. 1.5 mL of  1% lidocaine and 5 mL of 1% lidocaine with epinephrine were used for  local anesthesia. A small skin incision was then made to permit passage  of a 10 g needle with a vacuum-assisted biopsy device. 4 core specimens  were obtained. A bowtie clip was then deployed at the biopsy site.      Site 2: The mass at 10:00, 8 cm from the nipple in the left breast was  targeted under ultrasound. The skin was cleansed and prepped. 2 mL of 1%  lidocaine and 7 mL of 1% lidocaine with epinephrine were used for local  anesthesia. A small skin incision was then made to permit passage of a  10 g needle with a vacuum-assisted biopsy device. 5 core specimens were  obtained. A U-shaped clip was then deployed at the biopsy site.      The patient tolerated the procedures well with no immediate  complications.     Post-procedural digital mammographic views of the left breast  demonstrate appropriate positioning of the respective  biopsy clips.      PATHOLOGY:  Final Diagnosis  1.  Breast, left 11 o'clock position 5 cm FN, core biopsy: ([place] clip)  A.  Sclerosing adenosis and microcyst.  ?  2.  Breast, left 10 o'clock position 8 cm FN, core biopsy: (U clip)  A.  Nodular pseudoangiomatous stromal hyperplasia.  Electronically signed by Ladan Gutierrez MD on 9/10/2024 at 1040        IMPRESSION:     1. Ultrasound-guided core needle biopsy of a 0.6 cm mass at 11:00, 5 cm  from the nipple in the left breast, marked with a bowtie clip. Pathology  is benign and concordant with the imaging assessment.     2. Ultrasound-guided core needle biopsy of a 2.3 cm mass at 10:00, 8 cm  from the nipple in the left breast, marked with a U-shaped clip.  Pathology is benign but not clearly concordant with the imaging  assessment given the irregular morphology on mammogram. Additionally,  this corresponds to the patient indicated palpable area, which she  states is a clinical change. Recommend surgical consultation for  excision.   9/9/2024 Pathology  Final Diagnosis   1.  Breast, left 11 o'clock position 5 cm FN, core biopsy: ([place]port clip)               A.  Sclerosing adenosis and microcyst.     2.  Breast, left 10 o'clock position 8 cm FN, core biopsy: (U clip)               A.  Nodular pseudoangiomatous stromal hyperplasia.       MEDICAL HISTORY:     Past Medical History:   Depression   Anxiety     Past Surgical History:    Past Surgical History:   Procedure Laterality Date    BARTHOLIN CYST MARSUPIALIZATION Left 03/12/2021    Procedure: PERINEAL CYST EXCISION;  Surgeon: Dominik Mccray MD;  Location: [facility];  Service: Obstetrics/Gynecology;  Laterality: Left;    BREAST BIOPSY  09-    Left breast... It's been painful    EYE SURGERY  85-86    I was like 9 my mother accidentally got a hook in my eye while fishing    FINGER SURGERY Right     Index finger    FOOT MASS EXCISION Left     Excision of a mass on the bottom of left foot    HYSTERECTOMY       "TVT, TVH    INCONTINENCE SURGERY      TONSILLECTOMY       Family History:    As above    Social History:   + vaping   Occasional alcohol use    Allergies:   Allergies   Allergen Reactions    Other Mental Status Change     WITH \"LAUGHING GAS\" AT DENTAL OFFICE       Medications:     Current Outpatient Medications:     buPROPion SR (WELLBUTRIN SR) 200 MG 12 hr tablet, Take 1 tablet by mouth 2 (Two) Times a Day., Disp: , Rfl: 10    busPIRone (BUSPAR) 7.5 MG tablet, Take 1 tablet by mouth 2 (two) times a day., Disp: , Rfl: 11    citalopram (CeleXA) 40 MG tablet, Take 1 tablet by mouth Every Morning., Disp: , Rfl: 11    clonazePAM (KlonoPIN) 1 MG tablet, 1 tablet 4 (Four) Times a Day As Needed., Disp: , Rfl: 3    gabapentin (NEURONTIN) 300 MG capsule, Take 1 capsule by mouth 3 (Three) Times a Day., Disp: , Rfl:     QUEtiapine (SEROquel) 200 MG tablet, Take 1 tablet by mouth., Disp: , Rfl: 4    vitamin D (ERGOCALCIFEROL) 1.25 MG (69917 UT) capsule capsule, Take 1 capsule by mouth Every 7 (Seven) Days., Disp: , Rfl:     Labs:    Labs from 6/30/2024 personally reviewed by me     ROS:   Influenza-like illness: no fever, no  cough, no  sore throat, no  body aches, no loss of sense of taste or smell, no known exposure to person with Covid-19.  Constitutional: Negative for fevers or chills  HENT: Negative for hearing loss or runny nose  Eyes: Negative for vision changes or scleral icterus  Respiratory: Negative for cough or shortness of breath  Cardiovascular: Negative for chest pain or heart palpitations  Gastrointestinal: Negative for abdominal pain, nausea, vomiting, constipation, melena, or hematochezia  Genitourinary: Negative for hematuria or dysuria  Musculoskeletal: Negative for joint swelling or gait instability  Neurologic: Negative for tremors or seizures  Psychiatric: Negative for suicidal ideations or depression  All other systems reviewed and negative    PHYSICAL EXAM:   Constitutional: Well-developed " well-nourished, no acute distress  Eyes: Conjunctiva normal, sclera nonicteric  ENMT: Hearing grossly normal, oral mucosa moist  Neck: Supple, no palpable mass, trachea midline  Respiratory: Clear to auscultation, normal inspiratory effort  Cardiovascular: Regular rate, no murmur, no peripheral edema, no jugular venous distention  Breast: symmetric  Right: No visible abnormalities on inspection while seated, with arms raised or hands on hips. No masses, skin changes, or nipple abnormalities.  Left:  No visible abnormalities on inspection while seated, with arms raised or hands on hips. No masses, skin changes, or nipple abnormalities.  No clinical chest wall involvement.  Gastrointestinal: Soft, nontender  Lymphatics (palpable nodes): No cervical, supraclavicular or axillary lymphadenopathy  Skin:  Warm, dry, no rash on visualized skin surfaces  Musculoskeletal: Symmetric strength, normal gait  Psychiatric: Alert and oriented ×3, normal affect     Class 2 Severe Obesity (BMI >=35 and <=39.9). Obesity-related health conditions include the following: none. Obesity is unchanged. BMI is is above average; BMI management plan is completed. We discussed portion control and increasing exercise.       DORETHA HSU M.D.  General and Endoscopic Surgery  [facility]    [address], [address]  [place], [place], [address]  P: [phone]  F: [phone]

## 2024-10-01 ENCOUNTER — OFFICE VISIT (OUTPATIENT)
Dept: OBSTETRICS AND GYNECOLOGY | Age: 49
End: 2024-10-01
Payer: COMMERCIAL

## 2024-10-01 VITALS
DIASTOLIC BLOOD PRESSURE: 70 MMHG | WEIGHT: 211 LBS | BODY MASS INDEX: 35.16 KG/M2 | SYSTOLIC BLOOD PRESSURE: 114 MMHG | HEIGHT: 65 IN

## 2024-10-01 DIAGNOSIS — Z12.11 ENCOUNTER FOR SCREENING COLONOSCOPY: ICD-10-CM

## 2024-10-01 DIAGNOSIS — N32.81 OVERACTIVE BLADDER: ICD-10-CM

## 2024-10-01 DIAGNOSIS — Z12.31 BREAST CANCER SCREENING BY MAMMOGRAM: ICD-10-CM

## 2024-10-01 DIAGNOSIS — Z01.419 ENCOUNTER FOR GYNECOLOGICAL EXAMINATION: Primary | ICD-10-CM

## 2024-10-01 DIAGNOSIS — N95.2 ATROPHIC VAGINITIS: ICD-10-CM

## 2024-10-01 DIAGNOSIS — Z91.89 AT HIGH RISK FOR BREAST CANCER: ICD-10-CM

## 2024-10-01 LAB
BILIRUB BLD-MCNC: NEGATIVE MG/DL
CLARITY, POC: CLEAR
COLOR UR: YELLOW
GLUCOSE UR STRIP-MCNC: NEGATIVE MG/DL
KETONES UR QL: NEGATIVE
LEUKOCYTE EST, POC: NEGATIVE
NITRITE UR-MCNC: NEGATIVE MG/ML
PH UR: 6 [PH] (ref 5–8)
PROT UR STRIP-MCNC: ABNORMAL MG/DL
RBC # UR STRIP: NEGATIVE /UL
SP GR UR: 1.03 (ref 1–1.03)
UROBILINOGEN UR QL: NORMAL

## 2024-10-01 RX ORDER — PHENTERMINE HYDROCHLORIDE 37.5 MG/1
37.5 CAPSULE ORAL
COMMUNITY
Start: 2024-09-30 | End: 2024-10-30

## 2024-10-01 RX ORDER — OXYBUTYNIN CHLORIDE 5 MG/1
5 TABLET, EXTENDED RELEASE ORAL DAILY
Qty: 60 TABLET | Refills: 6 | Status: SHIPPED | OUTPATIENT
Start: 2024-10-01

## 2024-10-01 RX ORDER — OXYCODONE AND ACETAMINOPHEN 7.5; 325 MG/1; MG/1
1 TABLET ORAL
COMMUNITY
Start: 2024-09-30 | End: 2024-10-30

## 2024-10-01 NOTE — PROGRESS NOTES
Subjective     Chief Complaint   Patient presents with    Gynecologic Exam     Annual:last pap ,mammo ,breast biopsy ,Discuss urinary frequency         History of Present Illness  Wellness exam  Ester Kaur is a very pleasant 49 y.o. female ., Mammo Exam up-to-date see note below, Exercise encouraged  Ester has had a TVH and TVT.  She has had some abnormal breast imaging with biopsy she has seen Dr. Claribel Harris but is requesting a second opinion.  Pathology showed PASH but reportedly was incomplete and inconsistent    She also started to have urinary frequency not stress urinary incontinence, we will give her a trial of Ditropan XL but also refer to urogyn    She is overdue for colonoscopy and we are going to put that referral in as well.      Obstetric History:  OB History          3    Para   3    Term   3            AB        Living             SAB        IAB        Ectopic        Molar        Multiple        Live Births   3               Menstrual History:     No LMP recorded. Patient has had a hysterectomy.       Sexual History:       Past Medical History:   Diagnosis Date    Anxiety     At risk for sleep apnea     STOP BANG SCORE 5    Breast mass 2024    Chronic back pain     Cough     Depression     Hearing loss     NO AIDS    Perineal cyst in female     Plantar fasciitis, bilateral     PONV (postoperative nausea and vomiting)     I'm allergic to laughing gas of all kind's    Stress incontinence in female       Past Surgical History:   Procedure Laterality Date    BARTHOLIN CYST MARSUPIALIZATION Left 2021    Procedure: PERINEAL CYST EXCISION;  Surgeon: Dominik Mccray MD;  Location: [facility];  Service: Obstetrics/Gynecology;  Laterality: Left;    BREAST BIOPSY  2024    Left breast... It's been painful    EYE SURGERY  85-86    I was like 9 my mother accidentally got a hook in my eye while fishing    FINGER SURGERY Right     Index finger    FOOT  "MASS EXCISION Left     Excision of a mass on the bottom of left foot    HYSTERECTOMY      TVT, TVH    INCONTINENCE SURGERY      TONSILLECTOMY         SOCIAL Hx:      The following portions of the patient's history were reviewed and updated as appropriate: allergies, current medications, past family history, past medical history, past social history, past surgical history and problem list.    Review of Systems        Except as outlined in history of physical illness, patient denies any changes in her GYN, , GI systems. All other systems reviewed are negative      Urinary incontinence assessment discussed       Objective   Physical Exam    /70   Ht 165.1 cm (65\")   Wt 95.7 kg (211 lb)   BMI 35.11 kg/m²     General: Patient is alert and oriented and appears overall healthy  Neck: Is supple without thyromegaly, no carotid bruits and no lymphadenopathy  Lungs: Clear bilaterally, no wheezing, rhonchi, or rales.  Respiratory rate is normal  Breast: Symmetrical, no lymphadenopathy, no masses, no erythema, no discharge  Heart: Regular rate and rhythm are appreciated, no murmurs or rubs are heard  Abdomen: Is soft, without organomegaly, bowel sounds are positive, there is no                                  rebound or guarding and palpation does not produce any discomfort  Back: Nontender without CVA tenderness  Pelvic: External genitalia appear normal and consistent with mature female.  BUS normal                  Urethral meatus is normal without discharge masses or tenderness                Urethra is normal without tenderness                Bladder is not enlarged not tender                            Vagina is clean dry without discharge and appears adequately estrogenized,               no lesions or masses are present, adequate support is noted, no obvious cystocele is seen              Cervix & Uterus are absent              Adnexa are non-enlarged, non tender               Rectal digital exam reveals " adequate sphincter tone and no masses or lesions             are  appreciated on digital rectal examination.         Patient Active Problem List   Diagnosis    History of total vaginal hysterectomy    Narcotic dependence    Chronic pain disorder    Depression    Perineal cyst in female    Overactive bladder    At high risk for breast cancer               Assessment & Plan   Diagnoses and all orders for this visit:    1. Encounter for gynecological examination (Primary)  -     IGP, Apt HPV,rfx 16 / 18,45    2. Breast cancer screening by mammogram    3. Atrophic vaginitis    4. Encounter for screening colonoscopy  -     Mammo Screening Digital Tomosynthesis Bilateral With CAD; Future  -     Ambulatory Referral to Colorectal Surgery  -     Ambulatory Referral to Gynecologic Urology    5. Overactive bladder  Comments:  Previously had a TVT, trial of Ditropan XL  Overview:  Previously had a TVT, trial of Ditropan XL    Orders:  -     Mammo Screening Digital Tomosynthesis Bilateral With CAD; Future  -     Ambulatory Referral to Colorectal Surgery  -     Ambulatory Referral to Gynecologic Urology    6. At high risk for breast cancer  Comments:  PAS-referred for prophylactic medical therapy-patient request a second opinion  Orders:  -     Ambulatory Referral to Breast Surgery    Other orders  -     oxybutynin XL (DITROPAN-XL) 5 MG 24 hr tablet; Take 1 tablet by mouth Daily. 1 daily  Dispense: 60 tablet; Refill: 6         Discussed today's findings and concerns with patient.  Continue to recommend regular exercise including cardiovascular and resistance training as well as  breast self-exam. Wellness lab, mammography, & pap smear, in accordance with age guidelines.    I have encouraged her to call for today's test results if she has not received them within 10 days.  Patient is advised to call with any change in her condition or with any other questions, otherwise return  for annual examination.

## 2024-10-02 ENCOUNTER — TELEPHONE (OUTPATIENT)
Dept: SURGERY | Facility: CLINIC | Age: 49
End: 2024-10-02
Payer: COMMERCIAL

## 2024-10-02 NOTE — TELEPHONE ENCOUNTER
Pt calling to move forward with the surgery you discussed and wondering if she would also be able to get a colonoscopy done by you. (Not at the same time) but she was concerned about the prep. She wondered if there was a pill form. Can you place orders for breast surgery. Ok to leave voicemail.

## 2024-10-07 ENCOUNTER — PREP FOR SURGERY (OUTPATIENT)
Dept: OTHER | Facility: HOSPITAL | Age: 49
End: 2024-10-07
Payer: COMMERCIAL

## 2024-10-07 DIAGNOSIS — Z12.11 SCREENING FOR COLON CANCER: Primary | ICD-10-CM

## 2024-10-07 LAB
CYTOLOGIST CVX/VAG CYTO: NORMAL
CYTOLOGY CVX/VAG DOC CYTO: NORMAL
CYTOLOGY CVX/VAG DOC THIN PREP: NORMAL
DX ICD CODE: NORMAL
HPV I/H RISK 4 DNA CVX QL PROBE+SIG AMP: NEGATIVE
Lab: NORMAL
OTHER STN SPEC: NORMAL
STAT OF ADQ CVX/VAG CYTO-IMP: NORMAL

## 2024-10-08 ENCOUNTER — PREP FOR SURGERY (OUTPATIENT)
Dept: OTHER | Facility: HOSPITAL | Age: 49
End: 2024-10-08
Payer: COMMERCIAL

## 2024-10-08 ENCOUNTER — TELEPHONE (OUTPATIENT)
Dept: SURGERY | Facility: CLINIC | Age: 49
End: 2024-10-08
Payer: COMMERCIAL

## 2024-10-08 DIAGNOSIS — R92.8 ABNORMAL FINDING ON BREAST IMAGING: Primary | ICD-10-CM

## 2024-10-08 NOTE — TELEPHONE ENCOUNTER
Pt is very upset worried about her breast surgery & why no one has contacted her she daid she will deal with getting a c-scope @ a later date

## 2024-10-08 NOTE — TELEPHONE ENCOUNTER
Patient has been contacted and surgery scheduled for 11/08.  I explained that doing a colonoscopy with her breast surgery would delay her breast surgery since ENDO cannot travel to the [facility].  Patient understood and wants to proceed with breast surgery.  She will pursue colonoscopy early next year and will call our office back in late November/early December to schedule colonoscopy with Dr. Harris.  Patient wanted to know what hold up was with someone calling her to schedule and I explained that it had been being worked on and that documentation was in EPIC for every phone call and communication that she made.  I explained that Dr. Harris was out of the office last week and was just now addressing her backlog of messages from last week. The patient understood the situation as explained and was agreeable with moving forward with the scheduled breast procedure with colonoscopy at a later date.

## 2024-11-01 ENCOUNTER — PRE-ADMISSION TESTING (OUTPATIENT)
Dept: PREADMISSION TESTING | Facility: HOSPITAL | Age: 49
End: 2024-11-01
Payer: COMMERCIAL

## 2024-11-01 ENCOUNTER — TELEPHONE (OUTPATIENT)
Dept: SURGERY | Facility: CLINIC | Age: 49
End: 2024-11-01
Payer: COMMERCIAL

## 2024-11-01 ENCOUNTER — PREP FOR SURGERY (OUTPATIENT)
Dept: OTHER | Facility: HOSPITAL | Age: 49
End: 2024-11-01
Payer: COMMERCIAL

## 2024-11-01 VITALS
OXYGEN SATURATION: 98 % | BODY MASS INDEX: 34.24 KG/M2 | WEIGHT: 205.5 LBS | HEART RATE: 98 BPM | HEIGHT: 65 IN | DIASTOLIC BLOOD PRESSURE: 73 MMHG | TEMPERATURE: 98 F | RESPIRATION RATE: 16 BRPM | SYSTOLIC BLOOD PRESSURE: 132 MMHG

## 2024-11-01 LAB
ANION GAP SERPL CALCULATED.3IONS-SCNC: 9.1 MMOL/L (ref 5–15)
BUN SERPL-MCNC: 13 MG/DL (ref 6–20)
BUN/CREAT SERPL: 11.5 (ref 7–25)
CALCIUM SPEC-SCNC: 9.2 MG/DL (ref 8.6–10.5)
CHLORIDE SERPL-SCNC: 102 MMOL/L (ref 98–107)
CO2 SERPL-SCNC: 25.9 MMOL/L (ref 22–29)
CREAT SERPL-MCNC: 1.13 MG/DL (ref 0.57–1)
DEPRECATED RDW RBC AUTO: 41.8 FL (ref 37–54)
EGFRCR SERPLBLD CKD-EPI 2021: 60.1 ML/MIN/1.73
ERYTHROCYTE [DISTWIDTH] IN BLOOD BY AUTOMATED COUNT: 12.3 % (ref 12.3–15.4)
GLUCOSE SERPL-MCNC: 112 MG/DL (ref 65–99)
HCT VFR BLD AUTO: 36.3 % (ref 34–46.6)
HGB BLD-MCNC: 12 G/DL (ref 12–15.9)
MCH RBC QN AUTO: 31 PG (ref 26.6–33)
MCHC RBC AUTO-ENTMCNC: 33.1 G/DL (ref 31.5–35.7)
MCV RBC AUTO: 93.8 FL (ref 79–97)
PLATELET # BLD AUTO: 205 10*3/MM3 (ref 140–450)
PMV BLD AUTO: 9.8 FL (ref 6–12)
POTASSIUM SERPL-SCNC: 4.3 MMOL/L (ref 3.5–5.2)
QT INTERVAL: 398 MS
QTC INTERVAL: 451 MS
RBC # BLD AUTO: 3.87 10*6/MM3 (ref 3.77–5.28)
SODIUM SERPL-SCNC: 137 MMOL/L (ref 136–145)
WBC NRBC COR # BLD AUTO: 4.97 10*3/MM3 (ref 3.4–10.8)

## 2024-11-01 PROCEDURE — 85027 COMPLETE CBC AUTOMATED: CPT

## 2024-11-01 PROCEDURE — 93005 ELECTROCARDIOGRAM TRACING: CPT

## 2024-11-01 PROCEDURE — 80048 BASIC METABOLIC PNL TOTAL CA: CPT

## 2024-11-01 PROCEDURE — 36415 COLL VENOUS BLD VENIPUNCTURE: CPT

## 2024-11-01 RX ORDER — PHENTERMINE HYDROCHLORIDE 37.5 MG/1
37.5 CAPSULE ORAL EVERY MORNING
COMMUNITY

## 2024-11-01 RX ORDER — OXYCODONE AND ACETAMINOPHEN 7.5; 325 MG/1; MG/1
1 TABLET ORAL EVERY 4 HOURS PRN
COMMUNITY

## 2024-11-01 NOTE — TELEPHONE ENCOUNTER
This patient's surgery had to be rescheduled due to patient taking phentermine.  Patient told PAT that she had last taken the medication on 10/31.  PAT advised patient that they would need to reschedule their surgery date of 11/08 due to not holding the medication for 14 days prior to the surgery.  Patient was upset and states that the medication was in her chart history.  However, it appears that during the office visit of 09/25 with Dr. Harris, the medication was not listed. Dr. Harris offered to do the surgery on 11/15 in the [facility] surgeon room while she is on [facility] service.  This was accepted as a suitable resolution by the patient.  Patient has now been rescheduled on 11/15 with Dr. Harris and is aware that she is to not take any phentermine until after her surgery.

## 2024-11-01 NOTE — DISCHARGE INSTRUCTIONS
Take the following medications the morning of surgery: GABAPENTIN, BUSPAR, BUPROPION, CELEXA      If you are on prescription narcotic pain medication to control your pain you may also take that medication the morning of surgery.      General Instructions:     Do not eat solid food after midnight the night before surgery.  Clear liquids day of surgery are allowed but must be stopped at least two hours before your hospital arrival time.       Allowed clear liquids      Water, sodas, and tea or coffee with no cream or milk added.       12 to 20 ounces of a clear liquid that contains carbohydrates is recommended.  If non-diabetic, have Gatorade or Powerade.  If diabetic, have G2 or Powerade Zero.     Do not have liquids red in color.  Do not consume chicken, beef, pork or vegetable broth or bouillon cubes of any variety as they are not considered clear liquids and are not allowed.    Patients who avoid smoking, chewing tobacco and alcohol for 4 weeks prior to surgery have a reduced risk of post-operative complications.  Quit smoking as many days before surgery as you can.  Do not smoke, use chewing tobacco or drink alcohol the day of surgery.   If applicable bring your C-PAP/ BI-PAP machine in with you to preop day of surgery.  Bring any papers given to you in the doctor’s office.  Wear clean comfortable clothes.  Do not wear contact lenses, false eyelashes or make-up.  Bring a case for your glasses.   Bring crutches or walker if applicable.  Remove all piercings.  Leave jewelry and any other valuables at home.  Hair extensions with metal clips must be removed prior to surgery.  The Pre-Admission Testing nurse will instruct you to bring medications if unable to obtain an accurate list in Pre-Admission Testing.        Preventing a Surgical Site Infection:  For 2 to 3 days before surgery, avoid shaving with a razor because the razor can irritate skin and make it easier to develop an infection.    Any areas of open skin  can increase the risk of a post-operative wound infection by allowing bacteria to enter and travel throughout the body.  Notify your surgeon if you have any skin wounds / rashes even if it is not near the expected surgical site.  The area will need assessed to determine if surgery should be delayed until it is healed.  The night prior to surgery shower using a fresh bar of anti-bacterial soap (such as Dial) and clean washcloth.  Sleep in a clean bed with clean clothing.  Do not allow pets to sleep with you.  Shower on the morning of surgery using a fresh bar of anti-bacterial soap (such as Dial) and clean washcloth.  Dry with a clean towel and dress in clean clothing.  Ask your surgeon if you will be receiving antibiotics prior to surgery.  Make sure you, your family, and all healthcare providers clean their hands with soap and water or an alcohol based hand  before caring for you or your wound.  CHLORHEXIDINE CLOTH INSTRUCTIONS  The morning of surgery follow these instructions using the Chlorhexidine cloths you've been given.  These steps reduce bacteria on the body.  Do not use the cloths near your eyes, ears mouth, genitalia or on open wounds.  Throw the cloths away after use but do not try to flush them down a toilet.      Open and remove one cloth at a time from the package.    Leave the cloth unfolded and begin the bathing.  Massage the skin with the cloths using gentle pressure to remove bacteria.  Do not scrub harshly.   Follow the steps below with one 2% CHG cloth per area (6 total cloths).  One cloth for neck, shoulders and chest.  One cloth for both arms, hands, fingers and underarms (do underarms last).  One cloth for the abdomen followed by groin.  One cloth for right leg and foot including between the toes.  One cloth for left leg and foot including between the toes.  The last cloth is to be used for the back of the neck, back and buttocks.    Allow the CHG to air dry 3 minutes on the skin  which will give it time to work and decrease the chance of irritation.  The skin may feel sticky until it is dry.  Do not rinse with water or any other liquid or you will lose the beneficial effects of the CHG.  If mild skin irritation occurs, do rinse the skin to remove the CHG.  Report this to the nurse at time of admission.  Do not apply lotions, creams, ointments, deodorants or perfumes after using the clothes. Dress in clean clothes before coming to the hospital.    Day of surgery:  Your arrival time is approximately two hours before your scheduled surgery time.  Please note if you have an early arrival time the surgery doors do not open before 5:00 AM.  Upon arrival, a Pre-op nurse and Anesthesiologist will review your health history, obtain vital signs, and answer questions you may have.  The only belongings needed at this time will be a list of your home medications and if applicable your C-PAP/BI-PAP machine.  A Pre-op nurse will start an IV and you may receive medication in preparation for surgery, including something to help you relax.     Please be aware that surgery does come with discomfort.  We want to make every effort to control your discomfort so please discuss any uncontrolled symptoms with your nurse.   Your doctor will most likely have prescribed pain medications.      If you are going home after surgery you will receive individualized written care instructions before being discharged.  A responsible adult must drive you to and from the hospital on the day of your surgery and ideally stay with you through the night.   .  Discharge prescriptions can be filled by the hospital pharmacy during regular pharmacy hours.  If you are having surgery late in the day/evening your prescription may be e-prescribed to your pharmacy.  Please verify your pharmacy hours or chose a 24 hour pharmacy to avoid not having access to your prescription because your pharmacy has closed for the day.    If you are staying  overnight following surgery, you will be transported to your hospital room following the recovery period.  [facility] has all private rooms.    If you have any questions please call Pre-Admission Testing at [phone].  Deductibles and co-payments are collected on the day of service. Please be prepared to pay the required co-pay, deductible or deposit on the day of service as defined by your plan.    Call your surgeon immediately if you experience any of the following symptoms:  Sore Throat  Shortness of Breath or difficulty breathing  Cough  Chills  Body soreness or muscle pain  Headache  Fever  New loss of taste or smell  Do not arrive for your surgery ill.  Your procedure will need to be rescheduled to another time.  You will need to call your physician before the day of surgery to avoid any unnecessary exposure to hospital staff as well as other patients.

## 2024-11-08 ENCOUNTER — HOSPITAL ENCOUNTER (OUTPATIENT)
Dept: MAMMOGRAPHY | Facility: HOSPITAL | Age: 49
End: 2024-11-08
Payer: COMMERCIAL

## 2024-11-15 ENCOUNTER — ANESTHESIA (OUTPATIENT)
Dept: PERIOP | Facility: HOSPITAL | Age: 49
End: 2024-11-15
Payer: COMMERCIAL

## 2024-11-15 ENCOUNTER — TRANSCRIBE ORDERS (OUTPATIENT)
Dept: LAB | Facility: HOSPITAL | Age: 49
End: 2024-11-15
Payer: COMMERCIAL

## 2024-11-15 ENCOUNTER — ANCILLARY PROCEDURE (OUTPATIENT)
Dept: LAB | Facility: HOSPITAL | Age: 49
End: 2024-11-15
Payer: COMMERCIAL

## 2024-11-15 ENCOUNTER — HOSPITAL ENCOUNTER (OUTPATIENT)
Facility: HOSPITAL | Age: 49
Setting detail: HOSPITAL OUTPATIENT SURGERY
Discharge: HOME | End: 2024-11-15
Attending: STUDENT IN AN ORGANIZED HEALTH CARE EDUCATION/TRAINING PROGRAM | Admitting: STUDENT IN AN ORGANIZED HEALTH CARE EDUCATION/TRAINING PROGRAM
Payer: COMMERCIAL

## 2024-11-15 ENCOUNTER — APPOINTMENT (OUTPATIENT)
Dept: GENERAL RADIOLOGY | Facility: HOSPITAL | Age: 49
End: 2024-11-15
Payer: COMMERCIAL

## 2024-11-15 ENCOUNTER — ANESTHESIA EVENT (OUTPATIENT)
Dept: PERIOP | Facility: HOSPITAL | Age: 49
End: 2024-11-15
Payer: COMMERCIAL

## 2024-11-15 ENCOUNTER — APPOINTMENT (OUTPATIENT)
Dept: MAMMOGRAPHY | Facility: HOSPITAL | Age: 49
End: 2024-11-15
Payer: COMMERCIAL

## 2024-11-15 VITALS
WEIGHT: 212.2 LBS | DIASTOLIC BLOOD PRESSURE: 59 MMHG | TEMPERATURE: 97.6 F | OXYGEN SATURATION: 96 % | RESPIRATION RATE: 16 BRPM | BODY MASS INDEX: 35.35 KG/M2 | HEIGHT: 65 IN | SYSTOLIC BLOOD PRESSURE: 115 MMHG | HEART RATE: 77 BPM

## 2024-11-15 DIAGNOSIS — R92.8 ABNORMAL MAMMOGRAM OF LEFT BREAST: Primary | ICD-10-CM

## 2024-11-15 DIAGNOSIS — R92.8 ABNORMAL MAMMOGRAM OF LEFT BREAST: ICD-10-CM

## 2024-11-15 DIAGNOSIS — R92.8 ABNORMAL FINDING ON BREAST IMAGING: ICD-10-CM

## 2024-11-15 PROCEDURE — 25010000002 MIDAZOLAM PER 1 MG: Performed by: ANESTHESIOLOGY

## 2024-11-15 PROCEDURE — 76098 X-RAY EXAM SURGICAL SPECIMEN: CPT

## 2024-11-15 PROCEDURE — 25010000002 LIDOCAINE 2% SOLUTION: Performed by: NURSE ANESTHETIST, CERTIFIED REGISTERED

## 2024-11-15 PROCEDURE — 25810000003 LACTATED RINGERS PER 1000 ML: Performed by: ANESTHESIOLOGY

## 2024-11-15 PROCEDURE — 88307 TISSUE EXAM BY PATHOLOGIST: CPT | Performed by: STUDENT IN AN ORGANIZED HEALTH CARE EDUCATION/TRAINING PROGRAM

## 2024-11-15 PROCEDURE — 25010000002 MIDAZOLAM PER 1 MG: Performed by: NURSE ANESTHETIST, CERTIFIED REGISTERED

## 2024-11-15 PROCEDURE — 25010000002 GLYCOPYRROLATE 1 MG/5ML SOLUTION: Performed by: NURSE ANESTHETIST, CERTIFIED REGISTERED

## 2024-11-15 PROCEDURE — 19125 EXCISION BREAST LESION: CPT | Performed by: STUDENT IN AN ORGANIZED HEALTH CARE EDUCATION/TRAINING PROGRAM

## 2024-11-15 PROCEDURE — 25010000002 PROPOFOL 200 MG/20ML EMULSION: Performed by: NURSE ANESTHETIST, CERTIFIED REGISTERED

## 2024-11-15 PROCEDURE — C1819 TISSUE LOCALIZATION-EXCISION: HCPCS

## 2024-11-15 PROCEDURE — 25010000002 HYDROMORPHONE PER 4 MG: Performed by: NURSE ANESTHETIST, CERTIFIED REGISTERED

## 2024-11-15 PROCEDURE — 25010000002 PROPOFOL 10 MG/ML EMULSION: Performed by: NURSE ANESTHETIST, CERTIFIED REGISTERED

## 2024-11-15 PROCEDURE — 25010000002 LIDOCAINE 1 % SOLUTION: Performed by: STUDENT IN AN ORGANIZED HEALTH CARE EDUCATION/TRAINING PROGRAM

## 2024-11-15 PROCEDURE — 25010000002 CEFAZOLIN PER 500 MG: Performed by: STUDENT IN AN ORGANIZED HEALTH CARE EDUCATION/TRAINING PROGRAM

## 2024-11-15 RX ORDER — PROMETHAZINE HYDROCHLORIDE 25 MG/1
25 SUPPOSITORY RECTAL ONCE AS NEEDED
Status: DISCONTINUED | OUTPATIENT
Start: 2024-11-15 | End: 2024-11-15 | Stop reason: HOSPADM

## 2024-11-15 RX ORDER — HYDROCODONE BITARTRATE AND ACETAMINOPHEN 5; 325 MG/1; MG/1
1 TABLET ORAL ONCE AS NEEDED
Status: COMPLETED | OUTPATIENT
Start: 2024-11-15 | End: 2024-11-15

## 2024-11-15 RX ORDER — IPRATROPIUM BROMIDE AND ALBUTEROL SULFATE 2.5; .5 MG/3ML; MG/3ML
3 SOLUTION RESPIRATORY (INHALATION) ONCE AS NEEDED
Status: DISCONTINUED | OUTPATIENT
Start: 2024-11-15 | End: 2024-11-15 | Stop reason: HOSPADM

## 2024-11-15 RX ORDER — SODIUM CHLORIDE, SODIUM LACTATE, POTASSIUM CHLORIDE, CALCIUM CHLORIDE 600; 310; 30; 20 MG/100ML; MG/100ML; MG/100ML; MG/100ML
9 INJECTION, SOLUTION INTRAVENOUS CONTINUOUS
Status: DISCONTINUED | OUTPATIENT
Start: 2024-11-15 | End: 2024-11-15 | Stop reason: HOSPADM

## 2024-11-15 RX ORDER — EPHEDRINE SULFATE 50 MG/ML
5 INJECTION, SOLUTION INTRAVENOUS ONCE AS NEEDED
Status: DISCONTINUED | OUTPATIENT
Start: 2024-11-15 | End: 2024-11-15 | Stop reason: HOSPADM

## 2024-11-15 RX ORDER — MIDAZOLAM HYDROCHLORIDE 1 MG/ML
1 INJECTION, SOLUTION INTRAMUSCULAR; INTRAVENOUS
Status: COMPLETED | OUTPATIENT
Start: 2024-11-15 | End: 2024-11-15

## 2024-11-15 RX ORDER — BUPIVACAINE HYDROCHLORIDE AND EPINEPHRINE 5; 5 MG/ML; UG/ML
INJECTION, SOLUTION EPIDURAL; INTRACAUDAL; PERINEURAL AS NEEDED
Status: DISCONTINUED | OUTPATIENT
Start: 2024-11-15 | End: 2024-11-15 | Stop reason: HOSPADM

## 2024-11-15 RX ORDER — PROPOFOL 10 MG/ML
INJECTION, EMULSION INTRAVENOUS AS NEEDED
Status: DISCONTINUED | OUTPATIENT
Start: 2024-11-15 | End: 2024-11-15 | Stop reason: SURG

## 2024-11-15 RX ORDER — LIDOCAINE HYDROCHLORIDE 10 MG/ML
10 INJECTION, SOLUTION INFILTRATION; PERINEURAL ONCE
Status: COMPLETED | OUTPATIENT
Start: 2024-11-15 | End: 2024-11-15

## 2024-11-15 RX ORDER — LABETALOL HYDROCHLORIDE 5 MG/ML
5 INJECTION, SOLUTION INTRAVENOUS
Status: DISCONTINUED | OUTPATIENT
Start: 2024-11-15 | End: 2024-11-15 | Stop reason: HOSPADM

## 2024-11-15 RX ORDER — NALOXONE HCL 0.4 MG/ML
0.2 VIAL (ML) INJECTION AS NEEDED
Status: DISCONTINUED | OUTPATIENT
Start: 2024-11-15 | End: 2024-11-15 | Stop reason: HOSPADM

## 2024-11-15 RX ORDER — LIDOCAINE HYDROCHLORIDE 10 MG/ML
0.5 INJECTION, SOLUTION INFILTRATION; PERINEURAL ONCE AS NEEDED
Status: DISCONTINUED | OUTPATIENT
Start: 2024-11-15 | End: 2024-11-15 | Stop reason: HOSPADM

## 2024-11-15 RX ORDER — GLYCOPYRROLATE 0.2 MG/ML
INJECTION INTRAMUSCULAR; INTRAVENOUS AS NEEDED
Status: DISCONTINUED | OUTPATIENT
Start: 2024-11-15 | End: 2024-11-15 | Stop reason: SURG

## 2024-11-15 RX ORDER — FLUMAZENIL 0.1 MG/ML
0.2 INJECTION INTRAVENOUS AS NEEDED
Status: DISCONTINUED | OUTPATIENT
Start: 2024-11-15 | End: 2024-11-15 | Stop reason: HOSPADM

## 2024-11-15 RX ORDER — HYDRALAZINE HYDROCHLORIDE 20 MG/ML
5 INJECTION INTRAMUSCULAR; INTRAVENOUS
Status: DISCONTINUED | OUTPATIENT
Start: 2024-11-15 | End: 2024-11-15 | Stop reason: HOSPADM

## 2024-11-15 RX ORDER — FAMOTIDINE 10 MG/ML
20 INJECTION, SOLUTION INTRAVENOUS ONCE
Status: COMPLETED | OUTPATIENT
Start: 2024-11-15 | End: 2024-11-15

## 2024-11-15 RX ORDER — DROPERIDOL 2.5 MG/ML
0.62 INJECTION, SOLUTION INTRAMUSCULAR; INTRAVENOUS
Status: DISCONTINUED | OUTPATIENT
Start: 2024-11-15 | End: 2024-11-15 | Stop reason: HOSPADM

## 2024-11-15 RX ORDER — ONDANSETRON 2 MG/ML
4 INJECTION INTRAMUSCULAR; INTRAVENOUS ONCE AS NEEDED
Status: DISCONTINUED | OUTPATIENT
Start: 2024-11-15 | End: 2024-11-15 | Stop reason: HOSPADM

## 2024-11-15 RX ORDER — FENTANYL CITRATE 50 UG/ML
50 INJECTION, SOLUTION INTRAMUSCULAR; INTRAVENOUS
Status: DISCONTINUED | OUTPATIENT
Start: 2024-11-15 | End: 2024-11-15 | Stop reason: HOSPADM

## 2024-11-15 RX ORDER — DIPHENHYDRAMINE HYDROCHLORIDE 50 MG/ML
12.5 INJECTION INTRAMUSCULAR; INTRAVENOUS
Status: DISCONTINUED | OUTPATIENT
Start: 2024-11-15 | End: 2024-11-15 | Stop reason: HOSPADM

## 2024-11-15 RX ORDER — SODIUM CHLORIDE 0.9 % (FLUSH) 0.9 %
3-10 SYRINGE (ML) INJECTION AS NEEDED
Status: DISCONTINUED | OUTPATIENT
Start: 2024-11-15 | End: 2024-11-15 | Stop reason: HOSPADM

## 2024-11-15 RX ORDER — MIDAZOLAM HYDROCHLORIDE 1 MG/ML
INJECTION, SOLUTION INTRAMUSCULAR; INTRAVENOUS AS NEEDED
Status: DISCONTINUED | OUTPATIENT
Start: 2024-11-15 | End: 2024-11-15 | Stop reason: SURG

## 2024-11-15 RX ORDER — LIDOCAINE HYDROCHLORIDE 20 MG/ML
INJECTION, SOLUTION INFILTRATION; PERINEURAL AS NEEDED
Status: DISCONTINUED | OUTPATIENT
Start: 2024-11-15 | End: 2024-11-15 | Stop reason: SURG

## 2024-11-15 RX ORDER — OXYCODONE AND ACETAMINOPHEN 7.5; 325 MG/1; MG/1
1 TABLET ORAL EVERY 4 HOURS PRN
Status: DISCONTINUED | OUTPATIENT
Start: 2024-11-15 | End: 2024-11-15 | Stop reason: HOSPADM

## 2024-11-15 RX ORDER — FENTANYL CITRATE 50 UG/ML
50 INJECTION, SOLUTION INTRAMUSCULAR; INTRAVENOUS ONCE AS NEEDED
Status: DISCONTINUED | OUTPATIENT
Start: 2024-11-15 | End: 2024-11-15 | Stop reason: HOSPADM

## 2024-11-15 RX ORDER — SODIUM CHLORIDE 0.9 % (FLUSH) 0.9 %
3 SYRINGE (ML) INJECTION EVERY 12 HOURS SCHEDULED
Status: DISCONTINUED | OUTPATIENT
Start: 2024-11-15 | End: 2024-11-15 | Stop reason: HOSPADM

## 2024-11-15 RX ORDER — HYDROMORPHONE HYDROCHLORIDE 1 MG/ML
0.5 INJECTION, SOLUTION INTRAMUSCULAR; INTRAVENOUS; SUBCUTANEOUS
Status: DISCONTINUED | OUTPATIENT
Start: 2024-11-15 | End: 2024-11-15 | Stop reason: HOSPADM

## 2024-11-15 RX ORDER — PROMETHAZINE HYDROCHLORIDE 25 MG/1
25 TABLET ORAL ONCE AS NEEDED
Status: DISCONTINUED | OUTPATIENT
Start: 2024-11-15 | End: 2024-11-15 | Stop reason: HOSPADM

## 2024-11-15 RX ORDER — PROPOFOL 10 MG/ML
VIAL (ML) INTRAVENOUS CONTINUOUS PRN
Status: DISCONTINUED | OUTPATIENT
Start: 2024-11-15 | End: 2024-11-15 | Stop reason: SURG

## 2024-11-15 RX ADMIN — Medication 30 MG: at 10:24

## 2024-11-15 RX ADMIN — HYDROCODONE BITARTRATE AND ACETAMINOPHEN 1 TABLET: 5; 325 TABLET ORAL at 11:29

## 2024-11-15 RX ADMIN — MIDAZOLAM 1 MG: 1 INJECTION INTRAMUSCULAR; INTRAVENOUS at 07:53

## 2024-11-15 RX ADMIN — LIDOCAINE HYDROCHLORIDE 100 MG: 20 INJECTION, SOLUTION INFILTRATION; PERINEURAL at 10:20

## 2024-11-15 RX ADMIN — LIDOCAINE HYDROCHLORIDE 3 ML: 10 INJECTION, SOLUTION INFILTRATION; PERINEURAL at 08:41

## 2024-11-15 RX ADMIN — GLYCOPYRROLATE 0.2 MG: 1 INJECTION INTRAMUSCULAR; INTRAVENOUS at 10:25

## 2024-11-15 RX ADMIN — HYDROMORPHONE HYDROCHLORIDE 0.5 MG: 1 INJECTION, SOLUTION INTRAMUSCULAR; INTRAVENOUS; SUBCUTANEOUS at 11:29

## 2024-11-15 RX ADMIN — PROPOFOL 70 MG: 10 INJECTION, EMULSION INTRAVENOUS at 10:20

## 2024-11-15 RX ADMIN — PROPOFOL 150 MCG/KG/MIN: 10 INJECTION, EMULSION INTRAVENOUS at 10:20

## 2024-11-15 RX ADMIN — SODIUM CHLORIDE 2000 MG: 900 INJECTION INTRAVENOUS at 10:08

## 2024-11-15 RX ADMIN — MIDAZOLAM 1 MG: 1 INJECTION INTRAMUSCULAR; INTRAVENOUS at 08:11

## 2024-11-15 RX ADMIN — SODIUM CHLORIDE, SODIUM LACTATE, POTASSIUM CHLORIDE, CALCIUM CHLORIDE 9 ML/HR: 20; 30; 600; 310 INJECTION, SOLUTION INTRAVENOUS at 07:43

## 2024-11-15 RX ADMIN — MIDAZOLAM 1 MG: 1 INJECTION INTRAMUSCULAR; INTRAVENOUS at 10:24

## 2024-11-15 RX ADMIN — FAMOTIDINE 20 MG: 10 INJECTION INTRAVENOUS at 07:42

## 2024-11-15 NOTE — ANESTHESIA POSTPROCEDURE EVALUATION
"Patient: Ester Kaur    Procedure Summary       Date: 11/15/24 Room / Location: [facility] OR [address] [place] [address] MAIN OR    Anesthesia Start: 1013 Anesthesia Stop: 1106    Procedure: left breast needle-localized excisional biopsy (10:00, 8 cm FN, U-shaped clip) (Left: Breast) Diagnosis:       Abnormal finding on breast imaging      (Abnormal finding on breast imaging [R92.8])    Surgeons: Kirstin Harris MD Provider: Adam Reynaga MD    Anesthesia Type: MAC ASA Status: 2            Anesthesia Type: MAC    Vitals  Vitals Value Taken Time   /59 11/15/24 1200   Temp 36.4 °C (97.6 °F) 11/15/24 1105   Pulse 77 11/15/24 1200   Resp 16 11/15/24 1200   SpO2 96 % 11/15/24 1200           Post Anesthesia Care and Evaluation    Patient location during evaluation: bedside  Patient participation: complete - patient participated  Level of consciousness: sleepy but conscious  Pain score: 0  Pain management: adequate    Airway patency: patent  Anesthetic complications: No anesthetic complications    Cardiovascular status: acceptable  Respiratory status: acceptable  Hydration status: acceptable    Comments: /59   Pulse 77   Temp 36.4 °C (97.6 °F) (Oral)   Resp 16   Ht 165.1 cm (65\")   Wt 96.3 kg (212 lb 3.2 oz)   SpO2 96%   BMI 35.31 kg/m²       "

## 2024-11-15 NOTE — ANESTHESIA PREPROCEDURE EVALUATION
Anesthesia Evaluation     history of anesthetic complications:  PONV  NPO Solid Status: > 8 hours             Airway   Mallampati: I  Dental          Pulmonary    (+) a smoker Former,  (-) sleep apnea    ROS comment: Negative patient screen for ALFREDA    Cardiovascular     (-) hypertension      Neuro/Psych  (+) psychiatric history  GI/Hepatic/Renal/Endo    (+) obesity    Musculoskeletal     Abdominal    Substance History      OB/GYN          Other                    Anesthesia Plan    ASA 2     MAC       Anesthetic plan, risks, benefits, and alternatives have been provided, discussed and informed consent has been obtained with: patient.    CODE STATUS:

## 2024-11-15 NOTE — OP NOTE
OPERATIVE REPORT     DATE: 11/15/2024     SURGEON: Kirstin Harris MD      ASSISTANT: Tere Pham PA-C, who was present for necessary suctioning, retracting, suturing throughout the procedure      OPERATION PERFORMED: Left breast wire localized excisional biopsy     PREOPERATIVE DIAGNOSIS: discordant biopsy of the left breast      POSTOPERATIVE DIAGNOSIS: Same     ANESTHESIA: MAC     SPECIMEN: Left breast wire localized excisional biopsy (short stitch superior, long lateral, double anterior)     DRAINS: None     BLOOD LOSS: Minimal     INDICATION FOR OPERATION: Mrs. Kaur is a 49 y.o. lady who was recently diagnosed with a left breast discordant biopsy.  Left breast wire localized excisional biopsy was recommended. All risks (including bleeding, infection, damage to surrounding structures, need for further surgery, pathologic upgrade), benefits and alternatives were explained to the patient who agreed and wished to proceed. Informed consent was signed.      OPERATIVE COURSE: The patient was taken to the operating room, transferred onto the operating room table, and underwent anesthesia without incident. The patient was prepped and draped in sterile fashion. A time out was performed and preoperative antibiotics were given.  Half percent Marcaine with epinephrine was injected into the skin and subcutaneous tissues.  A curvilinear incision was made along the upper inner breast.  Bovie electrocautery was used to create a flap along the length of the wire 1 cm in thickness.  The tissue was transected around the tip of the wire.  Lastly the wire was brought through the incision with 2 hemostats.   The tissue was amputated at its base.  It was marked as listed above.  Specimen radiograph confirmed clip, wire, and abnormal breast tissue.  It was sent for fresh permanent specimen.  The area was irrigated and appeared hemostatic.  There were no signs of bleeding.  The rest of the local anesthetic was administered.  It  was closed with interrupted 3-0 Vicryl sutures and a running 4-0 Monocryl suture.  Skin glue was placed over the incision.  The patient tolerated the procedure well. All needle and lap counts were correct at the end of the case. The patient was then awoken from anesthesia and taken to recovery for further monitoring.       Kirstin Harris MD   General and Endoscopic Surgery  [facility]     [address], [address]  [place], [place], [address]  P: [phone]  F: [phone]

## 2024-11-15 NOTE — H&P
GENERAL SURGERY BENIGN BREAST HISTORY AND PHYSICAL      SUMMARY:  Ester Kaur is a 49 y.o. lady with:  A new diagnosis of a discordant left breast biopsy.  -Surgical plan: Discussed recommendation for left breast wire localized excisional biopsy. Risks (including bleeding, infection, damage to surrounding structures, need for additional surgery), benefits and alternatives were discussed with the patient who agreed and wished to proceed. Risk of pathologic upgrade was also discussed with possible need for additional treatment.      High risk screening:   -Rubia Ericksno lifetime breast cancer risk to be calculated postoperatively.     Referring Provider: No ref. provider found     Chief complaint: abnormal breast imaging     HPI: Ms. Ester Kaur is seen at the request of No ref. provider found. The patient is a 49 y.o. woman being seen for a new diagnosis of left breast discordant biopsy.       This was initially detected as an imaging abnormality on routine screening. Her work-up is detailed in the breast history section below. She has had one prior mammogram in 2017. She denies any prior history of abnormal mammograms or breast biopsies. She denies any breast lumps, skin changes, or nipple discharge. She has some breast pain.      She has a family history of breast cancer in her paternal aunt and paternal cousin. She denies any family history of ovarian cancer.      TIMELINE OF WORKUP:  6/25/2024 bilateral screening mammogram   IMPRESSION/RECOMMENDATION(S):  Left breast focal asymmetry. Recommend further evaluation with CC and MLO spot compression and lateral views with tomosynthesis and targeted ultrasound. No mammographic evidence of malignancy in the right breast.  BI-RADS Category 0: Incomplete     8/1/2024 Left Breast Diagnostic Mammo/US   FINDINGS: There are scattered areas of fibroglandular density.The area of asymmetry in the upper inner quadrant of the left breast persists on the spot compression  views. Targeted sonogram of the upper inner quadrant of the left breast was performed. In the 11 o'clock position of the left breast approximately 5 cm from the nipple there is an approximately 6 mm in greatest dimension hypoechoic nodule. This does not resemble a cyst and is suspicious for malignancy.  IMPRESSION:  1. Approximately 6 mm 11 o'clock position nodule of the left breast as discussed above.  2. Ultrasound-guided core biopsy recommended. BI-RADS 4.     9/9/2024 Left Breast US Guided Biopsy   PROCEDURE:   Targeted sonographic of the left breast was initially performed in the  area of concern indicated by the patient in the upper inner quadrant. At  10:00, 8 cm from the nipple, there is a 2.3 x 0.5 x 1.7 cm heterogeneous  predominately echogenic mass, which is suspicious. Ultrasound-guided  core needle biopsy of this area is also recommended, to be performed  today.     Site 1: The mass at 11:00, 5 cm from the nipple in the left breast was  targeted under ultrasound. The skin was cleansed and prepped. 1.5 mL of  1% lidocaine and 5 mL of 1% lidocaine with epinephrine were used for  local anesthesia. A small skin incision was then made to permit passage  of a 10 g needle with a vacuum-assisted biopsy device. 4 core specimens  were obtained. A bowtie clip was then deployed at the biopsy site.      Site 2: The mass at 10:00, 8 cm from the nipple in the left breast was  targeted under ultrasound. The skin was cleansed and prepped. 2 mL of 1%  lidocaine and 7 mL of 1% lidocaine with epinephrine were used for local  anesthesia. A small skin incision was then made to permit passage of a  10 g needle with a vacuum-assisted biopsy device. 5 core specimens were  obtained. A U-shaped clip was then deployed at the biopsy site.      The patient tolerated the procedures well with no immediate  complications.     Post-procedural digital mammographic views of the left breast  demonstrate appropriate positioning of the  respective biopsy clips.      PATHOLOGY:  Final Diagnosis  1.  Breast, left 11 o'clock position 5 cm FN, core biopsy: (Tamaroa clip)  A.  Sclerosing adenosis and microcyst.  ?  2.  Breast, left 10 o'clock position 8 cm FN, core biopsy: (U clip)  A.  Nodular pseudoangiomatous stromal hyperplasia.  Electronically signed by Ladan Gutierrez MD on 9/10/2024 at 1040        IMPRESSION:     1. Ultrasound-guided core needle biopsy of a 0.6 cm mass at 11:00, 5 cm  from the nipple in the left breast, marked with a bowtie clip. Pathology  is benign and concordant with the imaging assessment.     2. Ultrasound-guided core needle biopsy of a 2.3 cm mass at 10:00, 8 cm  from the nipple in the left breast, marked with a U-shaped clip.  Pathology is benign but not clearly concordant with the imaging  assessment given the irregular morphology on mammogram. Additionally,  this corresponds to the patient indicated palpable area, which she  states is a clinical change. Recommend surgical consultation for  excision.   9/9/2024 Pathology  Final Diagnosis   1.  Breast, left 11 o'clock position 5 cm FN, core biopsy: (Tamaroa clip)               A.  Sclerosing adenosis and microcyst.     2.  Breast, left 10 o'clock position 8 cm FN, core biopsy: (U clip)               A.  Nodular pseudoangiomatous stromal hyperplasia.         MEDICAL HISTORY:      Past Medical History:   Depression   Anxiety      Past Surgical History:    Surgical History         Past Surgical History:   Procedure Laterality Date    BARTHOLIN CYST MARSUPIALIZATION Left 03/12/2021     Procedure: PERINEAL CYST EXCISION;  Surgeon: Dominik Mccray MD;  [facility]: [facility];  Service: Obstetrics/Gynecology;  Laterality: Left;    BREAST BIOPSY   09-     Left breast... It's been painful    EYE SURGERY   [age]     I was like 9 my mother accidentally got a hook in my eye while fishing    FINGER SURGERY Right       Index finger    FOOT MASS EXCISION Left       Excision of a mass on  "the bottom of left foot    HYSTERECTOMY         TVT, TVH    INCONTINENCE SURGERY        TONSILLECTOMY             Family History:    As above     Social History:   + vaping   Occasional alcohol use     Allergies:   Allergies         Allergies   Allergen Reactions    Other Mental Status Change       WITH \"LAUGHING GAS\" AT DENTAL OFFICE            Medications:     Current Medications      Current Outpatient Medications:     buPROPion SR (WELLBUTRIN SR) 200 MG 12 hr tablet, Take 1 tablet by mouth 2 (Two) Times a Day., Disp: , Rfl: 10    busPIRone (BUSPAR) 7.5 MG tablet, Take 1 tablet by mouth 2 (two) times a day., Disp: , Rfl: 11    citalopram (CeleXA) 40 MG tablet, Take 1 tablet by mouth Every Morning., Disp: , Rfl: 11    clonazePAM (KlonoPIN) 1 MG tablet, 1 tablet 4 (Four) Times a Day As Needed., Disp: , Rfl: 3    gabapentin (NEURONTIN) 300 MG capsule, Take 1 capsule by mouth 3 (Three) Times a Day., Disp: , Rfl:     QUEtiapine (SEROquel) 200 MG tablet, Take 1 tablet by mouth., Disp: , Rfl: 4    vitamin D (ERGOCALCIFEROL) 1.25 MG (10420 UT) capsule capsule, Take 1 capsule by mouth Every 7 (Seven) Days., Disp: , Rfl:         Labs:    Labs from 6/30/2024 personally reviewed by me      ROS:   Influenza-like illness: no fever, no  cough, no  sore throat, no  body aches, no loss of sense of taste or smell, no known exposure to person with Covid-19.  Constitutional: Negative for fevers or chills  HENT: Negative for hearing loss or runny nose  Eyes: Negative for vision changes or scleral icterus  Respiratory: Negative for cough or shortness of breath  Cardiovascular: Negative for chest pain or heart palpitations  Gastrointestinal: Negative for abdominal pain, nausea, vomiting, constipation, melena, or hematochezia  Genitourinary: Negative for hematuria or dysuria  Musculoskeletal: Negative for joint swelling or gait instability  Neurologic: Negative for tremors or seizures  Psychiatric: Negative for suicidal ideations or " depression  All other systems reviewed and negative     PHYSICAL EXAM:   Constitutional: Well-developed well-nourished, no acute distress  Eyes: Conjunctiva normal, sclera nonicteric  ENMT: Hearing grossly normal, oral mucosa moist  Neck: Supple, no palpable mass, trachea midline  Respiratory: Clear to auscultation, normal inspiratory effort  Cardiovascular: Regular rate, no murmur, no peripheral edema, no jugular venous distention  Breast: symmetric  Right: No visible abnormalities on inspection while seated, with arms raised or hands on hips. No masses, skin changes, or nipple abnormalities.  Left:  No visible abnormalities on inspection while seated, with arms raised or hands on hips. No masses, skin changes, or nipple abnormalities.  No clinical chest wall involvement.  Gastrointestinal: Soft, nontender  Lymphatics (palpable nodes): No cervical, supraclavicular or axillary lymphadenopathy  Skin:  Warm, dry, no rash on visualized skin surfaces  Musculoskeletal: Symmetric strength, normal gait  Psychiatric: Alert and oriented ×3, normal affect      Class 2 Severe Obesity (BMI >=35 and <=39.9). Obesity-related health conditions include the following: none. Obesity is unchanged. BMI is is above average; BMI management plan is completed. We discussed portion control and increasing exercise.        DORETHA HSU M.D.  General and Endoscopic Surgery  [facility]     [facility], [address]  [place], [place], [facility]  P: [phone]  F: [phone]

## 2024-11-18 ENCOUNTER — TELEPHONE (OUTPATIENT)
Dept: SURGERY | Facility: CLINIC | Age: 49
End: 2024-11-18
Payer: COMMERCIAL

## 2024-11-18 NOTE — TELEPHONE ENCOUNTER
Patient called asking if her needle biopsy results have came in and if so, will you go over them with her at your nearest convenience?    She also would like to know if there are any restrictions for her. She stated that if she does not  that it is okay to leave voicemail.    Thank you!

## 2024-11-19 ENCOUNTER — TELEPHONE (OUTPATIENT)
Dept: SURGERY | Facility: CLINIC | Age: 49
End: 2024-11-19
Payer: COMMERCIAL

## 2024-11-19 LAB
CYTO UR: NORMAL
LAB AP CASE REPORT: NORMAL
PATH REPORT.FINAL DX SPEC: NORMAL
PATH REPORT.GROSS SPEC: NORMAL

## 2024-11-19 NOTE — TELEPHONE ENCOUNTER
----- Message from Kirstin Harris sent at 11/19/2024  1:30 PM EST -----  Regarding: pathology results  Can you let her know her pathology from surgery returned as benign? The abnormal tissue was completely removed and there were no other atypical cells, precancer, or cancer. She should follow up in 2 weeks.    Final Diagnosis  1.  Left breast, excisional biopsy:         A.  Benign breast parenchyma with focal pseudoangiomatous stromal hyperplasia (PASH).           B.  Negative for atypical hyperplasia or carcinoma.         C.  Clip and biopsy site changes present.        Thanks,   Kirstin Harris

## 2024-11-19 NOTE — TELEPHONE ENCOUNTER
Second Attempt. Patient calling again saying she needs to know if after the 11/15/24 procedure, if she will have any restrictions of any type. Patient is saying that her job requires reaching and heavy lifting, and that is why her employer is asking her. I have told her no lifting over 3 lbs until post op, and no stretching or reaching above her head, sports or exercise. Patient does not want her employer to take her off work, and is requesting a call back as she wants to know how long and weight limit after post op. Please advise, Thank You

## 2024-11-19 NOTE — TELEPHONE ENCOUNTER
Attempted to call patient to inform her of her results per , left voicemail for patient to give the office a call back (no verbal release).

## 2024-11-20 ENCOUNTER — TELEPHONE (OUTPATIENT)
Dept: SURGERY | Facility: CLINIC | Age: 49
End: 2024-11-20
Payer: COMMERCIAL

## 2024-11-20 NOTE — TELEPHONE ENCOUNTER
"Called and informed patient of her results per , patient stated understanding.    Patient wanted a follow up answer to her question about lifting restrictions, informed patient of what  said \" She has no lifting restrictions. She can do what she feels up to. She should wear a supportive sports bra when active for 2 weeks.\" Patient stated understanding.   "

## 2025-06-27 ENCOUNTER — TELEPHONE (OUTPATIENT)
Dept: OBSTETRICS AND GYNECOLOGY | Age: 50
End: 2025-06-27
Payer: COMMERCIAL

## 2025-06-27 ENCOUNTER — HOSPITAL ENCOUNTER (OUTPATIENT)
Dept: MAMMOGRAPHY | Facility: HOSPITAL | Age: 50
Discharge: HOME | End: 2025-06-27
Payer: COMMERCIAL

## 2025-06-27 DIAGNOSIS — N32.81 OVERACTIVE BLADDER: ICD-10-CM

## 2025-06-27 DIAGNOSIS — Z12.11 ENCOUNTER FOR SCREENING COLONOSCOPY: ICD-10-CM

## 2025-06-27 NOTE — TELEPHONE ENCOUNTER
Sushma @ [facility] contacted office this morning stating pt had an appt for screening mammo but could not complete due to pt c/o a new breast problem. She stated they need orders for a diagnostic bilateral mammo & Lt breast US. I advised pt has to be seen in office for evaluation of breast complaint before orders can be placed. Pt currently scheduled for additional imaging on 7/14/25 & needs to make an appt in office before that if possible. Left vm for pt to call back to schedule.

## 2025-07-09 ENCOUNTER — OFFICE VISIT (OUTPATIENT)
Dept: OBSTETRICS AND GYNECOLOGY | Age: 50
End: 2025-07-09
Payer: COMMERCIAL

## 2025-07-09 VITALS
DIASTOLIC BLOOD PRESSURE: 80 MMHG | BODY MASS INDEX: 35.16 KG/M2 | HEIGHT: 65 IN | SYSTOLIC BLOOD PRESSURE: 128 MMHG | WEIGHT: 211 LBS

## 2025-07-09 DIAGNOSIS — Z80.3 FAMILY HISTORY OF BREAST CANCER: ICD-10-CM

## 2025-07-09 DIAGNOSIS — Z12.83 SKIN CANCER SCREENING: Primary | ICD-10-CM

## 2025-07-09 DIAGNOSIS — N64.4 BREAST PAIN, LEFT: ICD-10-CM

## 2025-07-09 NOTE — PROGRESS NOTES
"Subjective     Chief Complaint   Patient presents with    Breast Problem     C/o breast problem, she said she went to have a mammogram and she mentioned about her left breast being sore and they said they could not do the mammogram.  She needed to follow up here.       Ester Kaur is a 49 y.o.  whose LMP is No LMP recorded. Patient has had a hysterectomy. presents with left breast pain    She has h/o breast biopsy and excision by Dr Harris  This was done in November  She did not f/u after  Was disappointed with the post op follow up    Has noted some discomfort in her left breast   Onset a month ago  Thought it was r/t her work as she has been working a lot more  Does think she feels something in the area as well  No skin changes or nipple d/c    Has paternal aunt with breast cancer dxed at 48 yoa  Has had it 5 times  No genetic testing done  Dad has h/o melanoma and prostate cancer    She is a pt of Dr Mccray    No Additional Complaints Reported    The following portions of the patient's history were reviewed and updated as appropriate:no additional history reviewed, vital signs, allergies, current medications, past medical history, past social history, past surgical history, and problem list      Review of Systems   Integument/breast: positive for breast lump and breast tenderness     Objective      /80   Ht 165.1 cm (65\")   Wt 95.7 kg (211 lb)   BMI 35.11 kg/m²     Physical Exam    General:   alert, comfortable, and no distress   Heart: Not performed today   Lungs: Not performed today.   Breast: Inspection negative, No nipple retraction or dimpling, No nipple discharge or bleeding, No axillary or supraclavicular adenopathy, positive findings: fibrocystic changes and nodular/dense tissue noted around the 1-2 o'clock location. TTP   Neck: Not performed today   Abdomen: Not performed today   CVA: Not performed today   Pelvis: Not performed today   Extremities: Not performed today   Neurologic: " negative   Psychiatric: Normal affect, judgement, and mood       Lab Review   Labs: No data reviewed    Imaging   No data reviewed    Assessment & Plan     ASSESSMENT  1. Skin cancer screening    2. Breast pain, left    3. Family history of breast cancer          PLAN  1.   Orders Placed This Encounter   Procedures    Mammo Diagnostic Digital Tomosynthesis Bilateral With CAD    US Breast Left Limited    [facility] Hereditary Cancer Screen    Ambulatory Referral to Dermatology     2. Breast imaging ordered and genetic testing ordered. May f/u with breast surgeon depending on imaging results and genetic testing results    3. Referral for derm given family h/o skin cancer. Also has a spot on her chest that she wants removed.     Follow up: HECTOR Doherty  7/9/2025

## 2025-07-11 ENCOUNTER — HOSPITAL ENCOUNTER (OUTPATIENT)
Dept: ULTRASOUND IMAGING | Facility: HOSPITAL | Age: 50
Discharge: HOME | End: 2025-07-11
Payer: COMMERCIAL

## 2025-07-11 ENCOUNTER — HOSPITAL ENCOUNTER (OUTPATIENT)
Dept: MAMMOGRAPHY | Facility: HOSPITAL | Age: 50
Discharge: HOME | End: 2025-07-11
Payer: COMMERCIAL

## 2025-07-11 PROCEDURE — 76642 ULTRASOUND BREAST LIMITED: CPT

## 2025-07-11 PROCEDURE — G0279 TOMOSYNTHESIS, MAMMO: HCPCS

## 2025-07-11 PROCEDURE — 77066 DX MAMMO INCL CAD BI: CPT

## 2025-08-08 ENCOUNTER — TELEPHONE (OUTPATIENT)
Dept: OBSTETRICS AND GYNECOLOGY | Age: 50
End: 2025-08-08
Payer: COMMERCIAL

## 2025-08-11 DIAGNOSIS — Z12.11 COLON CANCER SCREENING: Primary | ICD-10-CM

## (undated) DEVICE — SYR LL TP 10ML STRL

## (undated) DEVICE — GLV SURG BIOGEL LTX PF 6 1/2

## (undated) DEVICE — SUT MNCRYL PLS ANTIB UD 4/0 PS2 18IN

## (undated) DEVICE — LEGGINGS, PAIR, 31X48, STERILE: Brand: MEDLINE

## (undated) DEVICE — TRAP FLD MINIVAC MEGADYNE 100ML

## (undated) DEVICE — APPL CHLORAPREP HI/LITE 26ML ORNG

## (undated) DEVICE — LOU MINOR PROCEDURE: Brand: MEDLINE INDUSTRIES, INC.

## (undated) DEVICE — ADHS SKIN SURG TISS VISC PREMIERPRO EXOFIN HI/VISC FAST/DRY

## (undated) DEVICE — TUBING, SUCTION, 1/4" X 20', STRAIGHT: Brand: MEDLINE INDUSTRIES, INC.

## (undated) DEVICE — GLV SURG SIGNATURE ESSENTIAL PF LTX SZ8

## (undated) DEVICE — ANTIBACTERIAL UNDYED BRAIDED (POLYGLACTIN 910), SYNTHETIC ABSORBABLE SUTURE: Brand: COATED VICRYL

## (undated) DEVICE — STRAP STIRUP WO/ RNG

## (undated) DEVICE — MEDI-VAC YANKAUER SUCTION HANDLE W/BULBOUS TIP: Brand: CARDINAL HEALTH

## (undated) DEVICE — SPONGE,LAP,18"X18",DLX,XR,ST,5/PK,40/PK: Brand: MEDLINE

## (undated) DEVICE — SUT SILK 2/0 FS BLK 18IN 685G

## (undated) DEVICE — ELECTRD BLD EZ CLN MOD XLNG 2.75IN

## (undated) DEVICE — PENCL E/S ULTRAVAC TELESCP NOSE HOLSTR 10FT

## (undated) DEVICE — PATIENT RETURN ELECTRODE, SINGLE-USE, CONTACT QUALITY MONITORING, ADULT, WITH 9FT CORD, FOR PATIENTS WEIGING OVER 33LBS. (15KG): Brand: MEGADYNE

## (undated) DEVICE — NDL HYPO PRECISIONGLIDE REG 25G 1 1/2

## (undated) DEVICE — LOU D & C HYSTEROSCOPY: Brand: MEDLINE INDUSTRIES, INC.

## (undated) DEVICE — INTENDED FOR TISSUE SEPARATION, AND OTHER PROCEDURES THAT REQUIRE A SHARP SURGICAL BLADE TO PUNCTURE OR CUT.: Brand: BARD-PARKER ® CARBON RIB-BACK BLADES